# Patient Record
Sex: FEMALE | Race: WHITE | NOT HISPANIC OR LATINO | Employment: UNEMPLOYED | URBAN - NONMETROPOLITAN AREA
[De-identification: names, ages, dates, MRNs, and addresses within clinical notes are randomized per-mention and may not be internally consistent; named-entity substitution may affect disease eponyms.]

---

## 2017-09-01 ENCOUNTER — OFFICE VISIT (OUTPATIENT)
Dept: FAMILY MEDICINE CLINIC | Facility: CLINIC | Age: 67
End: 2017-09-01

## 2017-09-01 VITALS
OXYGEN SATURATION: 96 % | TEMPERATURE: 98.6 F | WEIGHT: 129.4 LBS | HEART RATE: 94 BPM | BODY MASS INDEX: 20.79 KG/M2 | SYSTOLIC BLOOD PRESSURE: 121 MMHG | HEIGHT: 66 IN | DIASTOLIC BLOOD PRESSURE: 85 MMHG

## 2017-09-01 DIAGNOSIS — F31.9 BIPOLAR AFFECTIVE DISORDER, REMISSION STATUS UNSPECIFIED (HCC): ICD-10-CM

## 2017-09-01 DIAGNOSIS — M79.672 BILATERAL FOOT PAIN: ICD-10-CM

## 2017-09-01 DIAGNOSIS — J01.00 ACUTE NON-RECURRENT MAXILLARY SINUSITIS: ICD-10-CM

## 2017-09-01 DIAGNOSIS — F32.A ANXIETY AND DEPRESSION: ICD-10-CM

## 2017-09-01 DIAGNOSIS — F41.9 ANXIETY AND DEPRESSION: ICD-10-CM

## 2017-09-01 DIAGNOSIS — K21.9 GASTROESOPHAGEAL REFLUX DISEASE, ESOPHAGITIS PRESENCE NOT SPECIFIED: Primary | ICD-10-CM

## 2017-09-01 DIAGNOSIS — M85.80 OSTEOPENIA: ICD-10-CM

## 2017-09-01 DIAGNOSIS — M79.671 BILATERAL FOOT PAIN: ICD-10-CM

## 2017-09-01 PROCEDURE — 99204 OFFICE O/P NEW MOD 45 MIN: CPT | Performed by: NURSE PRACTITIONER

## 2017-09-01 RX ORDER — QUETIAPINE FUMARATE 50 MG/1
50 TABLET, FILM COATED ORAL 2 TIMES DAILY
COMMUNITY
End: 2017-09-01 | Stop reason: SDUPTHER

## 2017-09-01 RX ORDER — DIVALPROEX SODIUM 500 MG/1
500 TABLET, DELAYED RELEASE ORAL 3 TIMES DAILY
Qty: 90 TABLET | Refills: 2 | Status: SHIPPED | OUTPATIENT
Start: 2017-09-01 | End: 2017-09-01 | Stop reason: SDUPTHER

## 2017-09-01 RX ORDER — OMEPRAZOLE 40 MG/1
40 CAPSULE, DELAYED RELEASE ORAL DAILY
Qty: 30 CAPSULE | Refills: 2 | Status: SHIPPED | OUTPATIENT
Start: 2017-09-01 | End: 2017-10-27 | Stop reason: SDUPTHER

## 2017-09-01 RX ORDER — DIVALPROEX SODIUM 500 MG/1
500 TABLET, DELAYED RELEASE ORAL 3 TIMES DAILY
COMMUNITY
End: 2017-09-01 | Stop reason: SDUPTHER

## 2017-09-01 RX ORDER — AMOXICILLIN 875 MG/1
875 TABLET, COATED ORAL 2 TIMES DAILY
Qty: 20 TABLET | Refills: 0 | Status: SHIPPED | OUTPATIENT
Start: 2017-09-01 | End: 2017-10-17

## 2017-09-01 RX ORDER — QUETIAPINE FUMARATE 50 MG/1
50 TABLET, FILM COATED ORAL 2 TIMES DAILY
Qty: 60 TABLET | Refills: 2 | Status: SHIPPED | OUTPATIENT
Start: 2017-09-01 | End: 2017-11-28 | Stop reason: SDUPTHER

## 2017-09-01 RX ORDER — DIVALPROEX SODIUM 500 MG/1
500 TABLET, DELAYED RELEASE ORAL 2 TIMES DAILY
Qty: 60 TABLET | Refills: 2 | Status: SHIPPED | OUTPATIENT
Start: 2017-09-01 | End: 2017-11-28 | Stop reason: SDUPTHER

## 2017-09-01 RX ORDER — OMEPRAZOLE 40 MG/1
40 CAPSULE, DELAYED RELEASE ORAL DAILY
Qty: 30 CAPSULE | Refills: 2 | Status: SHIPPED | OUTPATIENT
Start: 2017-09-01 | End: 2017-09-01 | Stop reason: SDUPTHER

## 2017-09-01 RX ORDER — QUETIAPINE FUMARATE 100 MG/1
100 TABLET, FILM COATED ORAL NIGHTLY
Qty: 30 TABLET | Refills: 2 | Status: SHIPPED | OUTPATIENT
Start: 2017-09-01 | End: 2017-11-28 | Stop reason: SDUPTHER

## 2017-09-01 RX ORDER — QUETIAPINE FUMARATE 100 MG/1
100 TABLET, FILM COATED ORAL NIGHTLY
COMMUNITY
End: 2017-09-01 | Stop reason: SDUPTHER

## 2017-09-01 RX ORDER — OMEPRAZOLE 40 MG/1
40 CAPSULE, DELAYED RELEASE ORAL DAILY
COMMUNITY
End: 2017-09-01 | Stop reason: SDUPTHER

## 2017-09-01 NOTE — PROGRESS NOTES
Subjective   Hamida Cazares is a 66 y.o. female.     Chief Complaint: Establish Care    History of Present Illness   Pt here today to establish care.  She has been living in [place], [place], and has moved here and needs to transfer care to this area.  Pt does have a history of GERD, bipolar disorder.    Head cold for 2 weeks; head congestion.  Amoxicillin usually seems to help.  Sinus congestion, maxillary sinus tenderness, mildly productive cough that is worse at night.  She states that she smokes 1/2 pack cigarettes a day.  She has smoked since she was 21 years old.  She has quit smoking 3 or 4 times in her lifetime but keeps going back to smoking.      Bipolar disorder with anxiety and depression in 1990s.  She has been taking Depakote and seroquel for the past 2-3 years and she has been doing really well.  She has had no issues at all with her bipolar disorder.  She states that her anxiety and depression is well controlled and her bipolar disorder is well controlled.  She states that she sleeps well.      Heel spurs.  She has had cortisone injections in the past by podiatrist and she would like to be referred to a podiatrist for additional injections if possible.  Pt states that she does have a lot of burning in her feet with the heel spurs.      Osteopenia.  Her last DEXA 4 months ago.  She is taking calcium, vit D and chondroitin.  She states that she had a lot of joint pains and since she has been taking the vitamins for several months and doing really well now.      GERD.  Pt has been taking omeprazole 40mg daily for the past 4-5 years when she was dx with gastritis.  Pt states that she had an EGD at that time.  She has been taking omeprazole since that time.  She denies abdominal pain, nausea, vomiting, diarrhea or constipation.  She does get acid reflux with spicy foods.  Otherwise her s/s are well controlled.      Pt states that she had lab work done, including depakote level, approx 3 months ago and all  her levels were within normal limits.     Pt refuses mammogram today.   Colonoscopy up to date.     Family History   Problem Relation Age of Onset   • Heart failure Mother    • Lung cancer Father        Social History     Social History   • Marital status: Single     Spouse name: N/A   • Number of children: N/A   • Years of education: N/A     Occupational History   • Not on file.     Social History Main Topics   • Smoking status: Heavy Tobacco Smoker     Packs/day: 0.50     Types: Cigarettes   • Smokeless tobacco: Never Used   • Alcohol use No   • Drug use: No   • Sexual activity: Defer     Other Topics Concern   • Not on file     Social History Narrative   • No narrative on file       Past Medical History:   Diagnosis Date   • Anxiety    • Arthritis    • Back pain    • Bipolar disorder    • Depression    • Gastritis    • GERD (gastroesophageal reflux disease)    • Heel spur        Review of Systems   Constitutional: Negative.    HENT: Positive for congestion and sinus pressure.    Respiratory: Positive for cough.    Cardiovascular: Negative.    Gastrointestinal: Negative.    Genitourinary: Negative.    Musculoskeletal:        Bilateral foot pain   Neurological: Negative.    Psychiatric/Behavioral: Negative.        Objective   Physical Exam   Constitutional: She is oriented to person, place, and time. She appears well-developed and well-nourished.   HENT:   Right Ear: External ear normal.   Left Ear: External ear normal.   Erythematous pharnyx; maxillary sinus tenderness   Eyes: Conjunctivae are normal. Pupils are equal, round, and reactive to light.   Neck: Normal range of motion. Neck supple.   Cardiovascular: Normal rate, regular rhythm and normal heart sounds.    Pulmonary/Chest: Effort normal. She has wheezes.   congested   Neurological: She is alert and oriented to person, place, and time.   Skin: Skin is warm and dry.   Psychiatric: She has a normal mood and affect. Her behavior is normal. Judgment and  "thought content normal.   Nursing note and vitals reviewed.      Procedures    Vitals: Blood pressure 121/85, pulse 94, temperature 98.6 °F (37 °C), temperature source Oral, height 65.5\" (166.4 cm), weight 129 lb 6.4 oz (58.7 kg), SpO2 96 %, not currently breastfeeding.    Allergies:   Allergies   Allergen Reactions   • Codeine    • Sulfa Antibiotics           Assessment/Plan   Hamida was seen today for establish care.    Diagnoses and all orders for this visit:    Gastroesophageal reflux disease, esophagitis presence not specified  -     Discontinue: omeprazole (priLOSEC) 40 MG capsule; Take 1 capsule by mouth Daily.  -     omeprazole (priLOSEC) 40 MG capsule; Take 1 capsule by mouth Daily.    Bilateral foot pain  -     Ambulatory Referral to Podiatry    Osteopenia    Bipolar affective disorder, remission status unspecified  -     Discontinue: divalproex (DEPAKOTE) 500 MG DR tablet; Take 1 tablet by mouth 3 (Three) Times a Day.  -     QUEtiapine (SEROquel) 100 MG tablet; Take 1 tablet by mouth Every Night.  -     QUEtiapine (SEROquel) 50 MG tablet; Take 1 tablet by mouth 2 (Two) Times a Day.  -     divalproex (DEPAKOTE) 500 MG DR tablet; Take 1 tablet by mouth 2 (Two) Times a Day.    Anxiety and depression  -     Discontinue: divalproex (DEPAKOTE) 500 MG DR tablet; Take 1 tablet by mouth 3 (Three) Times a Day.  -     QUEtiapine (SEROquel) 100 MG tablet; Take 1 tablet by mouth Every Night.  -     QUEtiapine (SEROquel) 50 MG tablet; Take 1 tablet by mouth 2 (Two) Times a Day.  -     divalproex (DEPAKOTE) 500 MG DR tablet; Take 1 tablet by mouth 2 (Two) Times a Day.    Acute non-recurrent maxillary sinusitis  -     amoxicillin (AMOXIL) 875 MG tablet; Take 1 tablet by mouth 2 (Two) Times a Day.               "

## 2017-10-17 ENCOUNTER — OFFICE VISIT (OUTPATIENT)
Dept: FAMILY MEDICINE CLINIC | Facility: CLINIC | Age: 67
End: 2017-10-17

## 2017-10-17 VITALS
BODY MASS INDEX: 20.96 KG/M2 | DIASTOLIC BLOOD PRESSURE: 74 MMHG | HEART RATE: 89 BPM | HEIGHT: 66 IN | OXYGEN SATURATION: 97 % | TEMPERATURE: 98.4 F | WEIGHT: 130.4 LBS | SYSTOLIC BLOOD PRESSURE: 106 MMHG

## 2017-10-17 DIAGNOSIS — Z23 IMMUNIZATION DUE: ICD-10-CM

## 2017-10-17 DIAGNOSIS — H66.90 ACUTE OTITIS MEDIA, UNSPECIFIED OTITIS MEDIA TYPE: ICD-10-CM

## 2017-10-17 DIAGNOSIS — R30.0 DYSURIA: ICD-10-CM

## 2017-10-17 DIAGNOSIS — R82.90 ABNORMAL URINALYSIS: Primary | ICD-10-CM

## 2017-10-17 PROBLEM — F31.9 BIPOLAR DISORDER: Status: ACTIVE | Noted: 2017-10-17

## 2017-10-17 PROBLEM — Q03.1: Status: ACTIVE | Noted: 2017-10-17

## 2017-10-17 PROBLEM — K21.9 GERD (GASTROESOPHAGEAL REFLUX DISEASE): Status: ACTIVE | Noted: 2017-10-17

## 2017-10-17 PROBLEM — M85.80 OSTEOPENIA: Status: ACTIVE | Noted: 2017-10-17

## 2017-10-17 LAB
BILIRUB BLD-MCNC: NEGATIVE MG/DL
CLARITY, POC: ABNORMAL
COLOR UR: YELLOW
GLUCOSE UR STRIP-MCNC: NEGATIVE MG/DL
KETONES UR QL: NEGATIVE
LEUKOCYTE EST, POC: NEGATIVE
NITRITE UR-MCNC: NEGATIVE MG/ML
PH UR: 6.5 [PH] (ref 5–8)
PROT UR STRIP-MCNC: ABNORMAL MG/DL
RBC # UR STRIP: NEGATIVE /UL
SP GR UR: 1.01 (ref 1–1.03)
UROBILINOGEN UR QL: NORMAL

## 2017-10-17 PROCEDURE — 90471 IMMUNIZATION ADMIN: CPT | Performed by: NURSE PRACTITIONER

## 2017-10-17 PROCEDURE — 87086 URINE CULTURE/COLONY COUNT: CPT | Performed by: NURSE PRACTITIONER

## 2017-10-17 PROCEDURE — 87077 CULTURE AEROBIC IDENTIFY: CPT | Performed by: NURSE PRACTITIONER

## 2017-10-17 PROCEDURE — 99213 OFFICE O/P EST LOW 20 MIN: CPT | Performed by: NURSE PRACTITIONER

## 2017-10-17 PROCEDURE — 81003 URINALYSIS AUTO W/O SCOPE: CPT | Performed by: NURSE PRACTITIONER

## 2017-10-17 PROCEDURE — 87186 SC STD MICRODIL/AGAR DIL: CPT | Performed by: NURSE PRACTITIONER

## 2017-10-17 PROCEDURE — 90662 IIV NO PRSV INCREASED AG IM: CPT | Performed by: NURSE PRACTITIONER

## 2017-10-17 RX ORDER — AMOXICILLIN 875 MG/1
875 TABLET, COATED ORAL 2 TIMES DAILY
Qty: 20 TABLET | Refills: 0 | Status: SHIPPED | OUTPATIENT
Start: 2017-10-17 | End: 2017-11-28

## 2017-10-17 NOTE — PROGRESS NOTES
Subjective   Hamida Cazares is a 67 y.o. female.     Chief Complaint: Difficulty Urinating    History of Present Illness   Patient here today with complaints of difficulty urinating.  She states that she has been having some mid lower pelvic pain also.  She denies any fever, confusion, nausea vomiting or diarrhea.  She states that she has had UTIs in the past and this is the same symptoms that she always has when she is getting a UTI.    Patient also states that she has been having some head congestion and cough.  She states that she has been having ear pain for the past week or so.  Again she denies any fever, headache, nausea vomiting or diarrhea.  Patient also is requesting a prescription for amoxicillin.  She states that this seems to be the only antibiotic that helps with her symptoms.  He does been several months since she has had any prescriptions for any antibiotics.    Family History   Problem Relation Age of Onset   • Heart failure Mother    • Lung cancer Father        Social History     Social History   • Marital status: Single     Spouse name: N/A   • Number of children: N/A   • Years of education: N/A     Occupational History   • Not on file.     Social History Main Topics   • Smoking status: Heavy Tobacco Smoker     Packs/day: 0.50     Types: Cigarettes   • Smokeless tobacco: Never Used   • Alcohol use No   • Drug use: No   • Sexual activity: Defer     Other Topics Concern   • Not on file     Social History Narrative       Past Medical History:   Diagnosis Date   • Anxiety    • Arthritis    • Back pain    • Bipolar disorder    • Depression    • Gastritis    • GERD (gastroesophageal reflux disease)    • Heel spur        Review of Systems   Constitutional: Negative.    HENT: Positive for ear pain.    Respiratory: Negative.    Cardiovascular: Negative.    Gastrointestinal: Negative.    Genitourinary: Positive for dysuria.   Musculoskeletal: Negative.    Skin: Negative.    Neurological: Positive for  "dizziness.   Psychiatric/Behavioral: Negative.        Objective   Physical Exam   Constitutional: She is oriented to person, place, and time. She appears well-developed and well-nourished.   HENT:   Head: Normocephalic and atraumatic.   Right Ear: External ear normal.   Left Ear: External ear normal.   Mouth/Throat: Oropharynx is clear and moist.   Bilateral TMs erythematous   Eyes: Conjunctivae are normal. Pupils are equal, round, and reactive to light.   Neck: Normal range of motion. Neck supple.   Cardiovascular: Normal rate, regular rhythm and normal heart sounds.    Pulmonary/Chest: Effort normal and breath sounds normal.   Neurological: She is alert and oriented to person, place, and time.   Skin: Skin is warm and dry.   Psychiatric: She has a normal mood and affect. Her behavior is normal. Judgment and thought content normal.   Nursing note and vitals reviewed.      Procedures    Vitals: Blood pressure 106/74, pulse 89, temperature 98.4 °F (36.9 °C), temperature source Oral, height 65.5\" (166.4 cm), weight 130 lb 6.4 oz (59.1 kg), SpO2 97 %, not currently breastfeeding.    Allergies:   Allergies   Allergen Reactions   • Codeine    • Sulfa Antibiotics           Assessment/Plan   Hamida was seen today for difficulty urinating.    Diagnoses and all orders for this visit:    Abnormal urinalysis  -     Urine Culture - Urine, Urine, Clean Catch    Dysuria  -     POCT urinalysis dipstick, automated  -     Urine Culture - Urine, Urine, Clean Catch    Acute otitis media, unspecified otitis media type  -     amoxicillin (AMOXIL) 875 MG tablet; Take 1 tablet by mouth 2 (Two) Times a Day.    Immunization due  -     Flu Vaccine High Dose PF 65YR+ ([id number])               "

## 2017-10-19 ENCOUNTER — TELEPHONE (OUTPATIENT)
Dept: FAMILY MEDICINE CLINIC | Facility: CLINIC | Age: 67
End: 2017-10-19

## 2017-10-19 LAB
BACTERIA SPEC AEROBE CULT: ABNORMAL
BACTERIA SPEC AEROBE CULT: ABNORMAL

## 2017-10-19 NOTE — TELEPHONE ENCOUNTER
----- Message from RAJESH Lorenzo sent at 10/19/2017 10:08 AM EDT -----  She was prescribed Amoxicillin.  She needs to repeat her U/A after completing prescription.  Send repeat U/A to lab for culture

## 2017-10-24 RX ORDER — NITROFURANTOIN 25; 75 MG/1; MG/1
100 CAPSULE ORAL 2 TIMES DAILY
Qty: 14 CAPSULE | Refills: 0 | Status: SHIPPED | OUTPATIENT
Start: 2017-10-24 | End: 2017-10-24 | Stop reason: SDUPTHER

## 2017-10-24 RX ORDER — NITROFURANTOIN 25; 75 MG/1; MG/1
100 CAPSULE ORAL 2 TIMES DAILY
Qty: 14 CAPSULE | Refills: 0 | Status: SHIPPED | OUTPATIENT
Start: 2017-10-24 | End: 2017-11-28

## 2017-10-24 NOTE — TELEPHONE ENCOUNTER
Pt came by office and I advised about u/a. Lillian sent new med in that will cover the bacteria the pt had.

## 2017-10-27 DIAGNOSIS — K21.9 GASTROESOPHAGEAL REFLUX DISEASE, ESOPHAGITIS PRESENCE NOT SPECIFIED: ICD-10-CM

## 2017-10-27 RX ORDER — OMEPRAZOLE 40 MG/1
40 CAPSULE, DELAYED RELEASE ORAL DAILY
Qty: 30 CAPSULE | Refills: 2 | Status: SHIPPED | OUTPATIENT
Start: 2017-10-27 | End: 2017-11-28 | Stop reason: SDUPTHER

## 2017-10-27 NOTE — TELEPHONE ENCOUNTER
Patient called requested her Omeprazole be sent to [facility] & her Pharmacy be changed to [facility],sent per orders & chart updated.

## 2017-10-30 ENCOUNTER — TELEPHONE (OUTPATIENT)
Dept: FAMILY MEDICINE CLINIC | Facility: CLINIC | Age: 67
End: 2017-10-30

## 2017-10-30 RX ORDER — ONDANSETRON HYDROCHLORIDE 8 MG/1
8 TABLET, FILM COATED ORAL EVERY 8 HOURS PRN
Qty: 10 TABLET | Refills: 0 | Status: ON HOLD | OUTPATIENT
Start: 2017-10-30 | End: 2020-10-06

## 2017-10-30 NOTE — TELEPHONE ENCOUNTER
Patient called requesting Zofran,reports she has severe nausea,has taken Zofran in the past & it really helped her?

## 2017-11-28 ENCOUNTER — OFFICE VISIT (OUTPATIENT)
Dept: FAMILY MEDICINE CLINIC | Facility: CLINIC | Age: 67
End: 2017-11-28

## 2017-11-28 VITALS
HEART RATE: 93 BPM | SYSTOLIC BLOOD PRESSURE: 117 MMHG | OXYGEN SATURATION: 97 % | HEIGHT: 66 IN | WEIGHT: 130.4 LBS | BODY MASS INDEX: 20.96 KG/M2 | DIASTOLIC BLOOD PRESSURE: 81 MMHG

## 2017-11-28 DIAGNOSIS — M79.672 BILATERAL FOOT PAIN: ICD-10-CM

## 2017-11-28 DIAGNOSIS — F31.9 BIPOLAR AFFECTIVE DISORDER, REMISSION STATUS UNSPECIFIED (HCC): ICD-10-CM

## 2017-11-28 DIAGNOSIS — F41.9 ANXIETY AND DEPRESSION: ICD-10-CM

## 2017-11-28 DIAGNOSIS — K21.9 GASTROESOPHAGEAL REFLUX DISEASE, ESOPHAGITIS PRESENCE NOT SPECIFIED: Primary | ICD-10-CM

## 2017-11-28 DIAGNOSIS — M79.671 BILATERAL FOOT PAIN: ICD-10-CM

## 2017-11-28 DIAGNOSIS — F32.A ANXIETY AND DEPRESSION: ICD-10-CM

## 2017-11-28 PROCEDURE — 99214 OFFICE O/P EST MOD 30 MIN: CPT | Performed by: NURSE PRACTITIONER

## 2017-11-28 RX ORDER — QUETIAPINE FUMARATE 100 MG/1
100 TABLET, FILM COATED ORAL NIGHTLY
Qty: 30 TABLET | Refills: 2 | Status: SHIPPED | OUTPATIENT
Start: 2017-11-28 | End: 2018-02-15 | Stop reason: SDUPTHER

## 2017-11-28 RX ORDER — OMEPRAZOLE 20 MG/1
20 CAPSULE, DELAYED RELEASE ORAL DAILY
Qty: 30 CAPSULE | Refills: 5 | Status: SHIPPED | OUTPATIENT
Start: 2017-11-28 | End: 2018-01-03 | Stop reason: SDUPTHER

## 2017-11-28 RX ORDER — QUETIAPINE FUMARATE 50 MG/1
50 TABLET, FILM COATED ORAL 2 TIMES DAILY
Qty: 60 TABLET | Refills: 2 | Status: SHIPPED | OUTPATIENT
Start: 2017-11-28 | End: 2018-02-15 | Stop reason: SDUPTHER

## 2017-11-28 RX ORDER — DIVALPROEX SODIUM 500 MG/1
500 TABLET, DELAYED RELEASE ORAL 2 TIMES DAILY
Qty: 60 TABLET | Refills: 2 | Status: SHIPPED | OUTPATIENT
Start: 2017-11-28 | End: 2018-02-15 | Stop reason: SDUPTHER

## 2017-11-28 NOTE — PROGRESS NOTES
Subjective   Hamida Cazares is a 67 y.o. female.     Chief Complaint: Follow-up (needs referral to ortho ); Heartburn; and Med Refill    History of Present Illness   Heel spurs for the past year.  She had an xray at urgent care and she had an xray and was told she had heel spurs.  She has a lot of pain in her feet.  She has seen a podiatrist in the past and has underwent steroid injections in her feet that did not help with her pain.  She is having a lot of pain when she is walking.  She would like to be referred to orthopedist for evaluation.     GERD.  Taking prilosec 40mg and would like to decrease her dose.  She is afraid of some of the side effects she has read about and would like to take the least dose possible for her symptoms.  She states that her symptoms are well controlled at this time and would like to decrease to 20mg to see if her symptoms will be controlled.    Tobacco abuse.  Father had lung cancer and she is afraid that she may have lung cancer also.  She is a smoker and has been for several years.     Bipolar disorder.  Pt has been taking depakote and seroquel for her symptoms for several months and has been tolerating these meds well.  She states that she feels better since she has been taking these medications.  Her symptoms are well controlled and she does not wish to change any meds or dosages at this time.  She is not currently seeing psych and would like to continue her meds.  She denies any suicidal thoughts.      Family History   Problem Relation Age of Onset   • Heart failure Mother    • Lung cancer Father        Social History     Social History   • Marital status: Single     Spouse name: N/A   • Number of children: N/A   • Years of education: N/A     Occupational History   • Not on file.     Social History Main Topics   • Smoking status: Heavy Tobacco Smoker     Packs/day: 0.50     Types: Cigarettes   • Smokeless tobacco: Never Used   • Alcohol use No   • Drug use: No   • Sexual activity:  "Defer     Other Topics Concern   • Not on file     Social History Narrative       Past Medical History:   Diagnosis Date   • Anxiety    • Arthritis    • Back pain    • Bipolar disorder    • Depression    • Gastritis    • GERD (gastroesophageal reflux disease)    • Heel spur        Review of Systems   Constitutional: Negative.    HENT: Negative.    Respiratory: Negative.    Cardiovascular: Negative.    Gastrointestinal: Negative.    Musculoskeletal:        Bilateral foot pain   Skin: Negative.    Neurological: Negative.    Psychiatric/Behavioral: Negative.        Objective   Physical Exam   Constitutional: She is oriented to person, place, and time. She appears well-developed and well-nourished.   Neck: Normal range of motion. Neck supple.   Cardiovascular: Normal rate, regular rhythm and normal heart sounds.    Pulmonary/Chest: Effort normal and breath sounds normal.   Neurological: She is alert and oriented to person, place, and time.   Skin: Skin is warm and dry.   Psychiatric: She has a normal mood and affect. Her behavior is normal. Judgment and thought content normal.   Nursing note and vitals reviewed.      Procedures    Vitals: Blood pressure 117/81, pulse 93, height 65.5\" (166.4 cm), weight 130 lb 6.4 oz (59.1 kg), SpO2 97 %, not currently breastfeeding.    Allergies:   Allergies   Allergen Reactions   • Codeine    • Sulfa Antibiotics           Assessment/Plan   Haimda was seen today for follow-up, heartburn and med refill.    Diagnoses and all orders for this visit:    Gastroesophageal reflux disease, esophagitis presence not specified  -     omeprazole (priLOSEC) 20 MG capsule; Take 1 capsule by mouth Daily.    Bilateral foot pain  -     Ambulatory Referral to Orthopedic Surgery    Bipolar affective disorder, remission status unspecified  -     divalproex (DEPAKOTE) 500 MG DR tablet; Take 1 tablet by mouth 2 (Two) Times a Day.  -     QUEtiapine (SEROquel) 100 MG tablet; Take 1 tablet by mouth Every " Night.  -     QUEtiapine (SEROquel) 50 MG tablet; Take 1 tablet by mouth 2 (Two) Times a Day.    Anxiety and depression  -     divalproex (DEPAKOTE) 500 MG DR tablet; Take 1 tablet by mouth 2 (Two) Times a Day.  -     QUEtiapine (SEROquel) 100 MG tablet; Take 1 tablet by mouth Every Night.  -     QUEtiapine (SEROquel) 50 MG tablet; Take 1 tablet by mouth 2 (Two) Times a Day.    Continue current meds as before.

## 2018-01-03 ENCOUNTER — TELEPHONE (OUTPATIENT)
Dept: FAMILY MEDICINE CLINIC | Facility: CLINIC | Age: 68
End: 2018-01-03

## 2018-01-03 DIAGNOSIS — K21.9 GASTROESOPHAGEAL REFLUX DISEASE, ESOPHAGITIS PRESENCE NOT SPECIFIED: ICD-10-CM

## 2018-01-03 RX ORDER — OMEPRAZOLE 20 MG/1
20 CAPSULE, DELAYED RELEASE ORAL DAILY
Qty: 30 CAPSULE | Refills: 0 | Status: SHIPPED | OUTPATIENT
Start: 2018-01-03 | End: 2018-02-15 | Stop reason: SDUPTHER

## 2018-01-03 NOTE — TELEPHONE ENCOUNTER
REQUESTS REFILL ON OMEPRAZOLE 20MG #30. [facility]    Refilled x 1 month pending follow up scheduled.

## 2018-01-09 ENCOUNTER — OFFICE VISIT (OUTPATIENT)
Dept: FAMILY MEDICINE CLINIC | Facility: CLINIC | Age: 68
End: 2018-01-09

## 2018-01-09 VITALS
WEIGHT: 127 LBS | DIASTOLIC BLOOD PRESSURE: 79 MMHG | HEIGHT: 66 IN | BODY MASS INDEX: 20.41 KG/M2 | OXYGEN SATURATION: 94 % | HEART RATE: 93 BPM | SYSTOLIC BLOOD PRESSURE: 108 MMHG

## 2018-01-09 DIAGNOSIS — K59.00 CONSTIPATION, UNSPECIFIED CONSTIPATION TYPE: ICD-10-CM

## 2018-01-09 DIAGNOSIS — M79.672 BILATERAL FOOT PAIN: Primary | ICD-10-CM

## 2018-01-09 DIAGNOSIS — R42 DIZZINESS: ICD-10-CM

## 2018-01-09 DIAGNOSIS — F41.9 ANXIETY: ICD-10-CM

## 2018-01-09 DIAGNOSIS — M79.671 BILATERAL FOOT PAIN: Primary | ICD-10-CM

## 2018-01-09 LAB
ALBUMIN SERPL-MCNC: 4.5 G/DL (ref 3.4–4.8)
ALBUMIN/GLOB SERPL: 1.7 G/DL (ref 1.5–2.5)
ALP SERPL-CCNC: 64 U/L (ref 35–104)
ALT SERPL W P-5'-P-CCNC: 18 U/L (ref 10–36)
ANION GAP SERPL CALCULATED.3IONS-SCNC: 3.7 MMOL/L (ref 3.6–11.2)
AST SERPL-CCNC: 35 U/L (ref 10–30)
BASOPHILS # BLD AUTO: 0.03 10*3/MM3 (ref 0–0.3)
BASOPHILS NFR BLD AUTO: 0.5 % (ref 0–2)
BILIRUB SERPL-MCNC: 0.3 MG/DL (ref 0.2–1.8)
BUN BLD-MCNC: 8 MG/DL (ref 7–21)
BUN/CREAT SERPL: 8.9 (ref 7–25)
CALCIUM SPEC-SCNC: 9.6 MG/DL (ref 7.7–10)
CHLORIDE SERPL-SCNC: 106 MMOL/L (ref 99–112)
CO2 SERPL-SCNC: 30.3 MMOL/L (ref 24.3–31.9)
CREAT BLD-MCNC: 0.9 MG/DL (ref 0.43–1.29)
DEPRECATED RDW RBC AUTO: 47.2 FL (ref 37–54)
EOSINOPHIL # BLD AUTO: 0.15 10*3/MM3 (ref 0–0.7)
EOSINOPHIL NFR BLD AUTO: 2.4 % (ref 0–7)
ERYTHROCYTE [DISTWIDTH] IN BLOOD BY AUTOMATED COUNT: 13.7 % (ref 11.5–14.5)
GFR SERPL CREATININE-BSD FRML MDRD: 63 ML/MIN/1.73
GLOBULIN UR ELPH-MCNC: 2.7 GM/DL
GLUCOSE BLD-MCNC: 77 MG/DL (ref 70–110)
HCT VFR BLD AUTO: 43.3 % (ref 37–47)
HGB BLD-MCNC: 14.3 G/DL (ref 12–16)
IMM GRANULOCYTES # BLD: 0.02 10*3/MM3 (ref 0–0.03)
IMM GRANULOCYTES NFR BLD: 0.3 % (ref 0–0.5)
LYMPHOCYTES # BLD AUTO: 2.51 10*3/MM3 (ref 1–3)
LYMPHOCYTES NFR BLD AUTO: 40.2 % (ref 16–46)
MAGNESIUM SERPL-MCNC: 2.2 MG/DL (ref 1.7–2.6)
MCH RBC QN AUTO: 32.2 PG (ref 27–33)
MCHC RBC AUTO-ENTMCNC: 33 G/DL (ref 33–37)
MCV RBC AUTO: 97.5 FL (ref 80–94)
MONOCYTES # BLD AUTO: 0.68 10*3/MM3 (ref 0.1–0.9)
MONOCYTES NFR BLD AUTO: 10.9 % (ref 0–12)
NEUTROPHILS # BLD AUTO: 2.85 10*3/MM3 (ref 1.4–6.5)
NEUTROPHILS NFR BLD AUTO: 45.7 % (ref 40–75)
OSMOLALITY SERPL CALC.SUM OF ELEC: 276.5 MOSM/KG (ref 273–305)
PLATELET # BLD AUTO: 178 10*3/MM3 (ref 130–400)
PMV BLD AUTO: 10.9 FL (ref 6–10)
POTASSIUM BLD-SCNC: 5 MMOL/L (ref 3.5–5.3)
PROT SERPL-MCNC: 7.2 G/DL (ref 6–8)
RBC # BLD AUTO: 4.44 10*6/MM3 (ref 4.2–5.4)
SODIUM BLD-SCNC: 140 MMOL/L (ref 135–153)
T4 FREE SERPL-MCNC: 0.89 NG/DL (ref 0.89–1.76)
TSH SERPL DL<=0.05 MIU/L-ACNC: 1.03 MIU/ML (ref 0.55–4.78)
VIT B12 BLD-MCNC: 458 PG/ML (ref 211–911)
WBC NRBC COR # BLD: 6.24 10*3/MM3 (ref 4.5–12.5)

## 2018-01-09 PROCEDURE — 80053 COMPREHEN METABOLIC PANEL: CPT | Performed by: NURSE PRACTITIONER

## 2018-01-09 PROCEDURE — 83735 ASSAY OF MAGNESIUM: CPT | Performed by: NURSE PRACTITIONER

## 2018-01-09 PROCEDURE — 82607 VITAMIN B-12: CPT | Performed by: NURSE PRACTITIONER

## 2018-01-09 PROCEDURE — 85025 COMPLETE CBC W/AUTO DIFF WBC: CPT | Performed by: NURSE PRACTITIONER

## 2018-01-09 PROCEDURE — 99214 OFFICE O/P EST MOD 30 MIN: CPT | Performed by: NURSE PRACTITIONER

## 2018-01-09 PROCEDURE — 84443 ASSAY THYROID STIM HORMONE: CPT | Performed by: NURSE PRACTITIONER

## 2018-01-09 PROCEDURE — 84439 ASSAY OF FREE THYROXINE: CPT | Performed by: NURSE PRACTITIONER

## 2018-01-09 RX ORDER — ASCORBIC ACID 500 MG
500 TABLET ORAL DAILY
Status: ON HOLD | COMMUNITY
End: 2020-10-06

## 2018-01-09 NOTE — PROGRESS NOTES
Subjective   Hamida Cazares is a 67 y.o. female.     Chief Complaint: Dizziness (pt requests referral to ENT )    History of Present Illness   Heel spurs for the past year.  She had an xray at urgent care and she had an xray and was told she had heel spurs.  She has a lot of pain in her feet.  She has seen a podiatrist in the past and has underwent steroid injections in her feet that did not help with her pain.  She is having a lot of pain when she is walking.  Pt states that she was evaluated by provider at [facility] and was not happy with the care she received.  She was told that she could not have surgery for her problem. Pt is requesting to be evaluated by Dr. An in [place].  I have agreed to refer patient to Dr. An.     Pt states that about 9 months ago she started having dizziness.  She states that she has been having the dizziness on a daily basis and it tends to come and go every day.  She is having to hold onto things to keep her balance and to keep from falling.  Her symptoms start as soon as she gets out of the bed.  It can happen when she is walking in the grocery store. She denies any fever, head congestion, n/v/d.  She doesn't feel that the room is spinning; she feels like the floor is moving up and down when she walks.     Pt states that she has occasional episodes for the past two years of having dots in vision.  She has had an eye exam and was told there was nothing wrong with her eyes or vision.  Patient has not had labs done in several months and requests to have labs done today.  Pt does c/o fatigue also.     Constipation.  Pt states that she is addicted to laxatives.  She states that she takes a laxative about every 4 days.  I have discussed with patient today the risks of continued laxative use.  Discussed fiber intake, fluid intake.      Family History   Problem Relation Age of Onset   • Heart failure Mother    • Lung cancer Father        Social History     Social History   •  "Marital status: Single     Spouse name: N/A   • Number of children: N/A   • Years of education: N/A     Occupational History   • Not on file.     Social History Main Topics   • Smoking status: Heavy Tobacco Smoker     Packs/day: 0.50     Types: Cigarettes   • Smokeless tobacco: Never Used   • Alcohol use No   • Drug use: No   • Sexual activity: Defer     Other Topics Concern   • Not on file     Social History Narrative       Past Medical History:   Diagnosis Date   • Anxiety    • Arthritis    • Back pain    • Bipolar disorder    • Depression    • Gastritis    • GERD (gastroesophageal reflux disease)    • Heel spur        Review of Systems   Constitutional: Positive for fatigue.   HENT: Negative.    Eyes: Positive for visual disturbance.   Respiratory: Negative.    Cardiovascular: Negative.    Gastrointestinal: Negative.    Musculoskeletal:        Bilateral foot pain   Skin: Negative.    Neurological: Positive for dizziness.   Psychiatric/Behavioral: The patient is nervous/anxious.        Objective   Physical Exam   Constitutional: She is oriented to person, place, and time. She appears well-developed and well-nourished.   Eyes: Conjunctivae are normal. Pupils are equal, round, and reactive to light.   Neck: Normal range of motion. Neck supple.   Cardiovascular: Normal rate, regular rhythm and normal heart sounds.    Pulmonary/Chest: Effort normal and breath sounds normal.   Neurological: She is alert and oriented to person, place, and time.   Skin: Skin is warm and dry.   Psychiatric: She has a normal mood and affect. Her behavior is normal. Judgment and thought content normal.   Nursing note and vitals reviewed.      Procedures    Vitals: Blood pressure 108/79, pulse 93, height 166.4 cm (65.5\"), weight 57.6 kg (127 lb), SpO2 94 %, not currently breastfeeding.    Allergies:   Allergies   Allergen Reactions   • Codeine    • Morphine And Related    • Sulfa Antibiotics           Assessment/Plan   Hamida was seen today " for dizziness.    Diagnoses and all orders for this visit:    Bilateral foot pain  -     Ambulatory Referral to Orthopedic Surgery  -     CBC & Differential  -     Comprehensive Metabolic Panel  -     Magnesium  -     TSH  -     T4, Free  -     Vitamin B12  -     CBC Auto Differential    Dizziness  -     Ambulatory Referral to ENT (Otolaryngology)  -     CBC & Differential  -     Comprehensive Metabolic Panel  -     Magnesium  -     TSH  -     T4, Free  -     Vitamin B12  -     CBC Auto Differential    Constipation, unspecified constipation type  -     CBC & Differential  -     Comprehensive Metabolic Panel  -     Magnesium  -     TSH  -     T4, Free  -     Vitamin B12  -     CBC Auto Differential    Anxiety  -     CBC & Differential  -     Comprehensive Metabolic Panel  -     Magnesium  -     TSH  -     T4, Free  -     Vitamin B12  -     CBC Auto Differential

## 2018-01-10 ENCOUNTER — TELEPHONE (OUTPATIENT)
Dept: FAMILY MEDICINE CLINIC | Facility: CLINIC | Age: 68
End: 2018-01-10

## 2018-01-10 NOTE — TELEPHONE ENCOUNTER
----- Message from RAJESH Lorenzo sent at 1/10/2018  1:10 PM EST -----  Labs appear to be stable.  Please let her know.        Attempted to contact patient,voice mailbox has not been set up,will attempt later.      Still unable to reach patient,stable letter mailed.

## 2018-02-15 ENCOUNTER — OFFICE VISIT (OUTPATIENT)
Dept: FAMILY MEDICINE CLINIC | Facility: CLINIC | Age: 68
End: 2018-02-15

## 2018-02-15 VITALS
WEIGHT: 128.6 LBS | HEIGHT: 66 IN | DIASTOLIC BLOOD PRESSURE: 86 MMHG | OXYGEN SATURATION: 98 % | BODY MASS INDEX: 20.67 KG/M2 | SYSTOLIC BLOOD PRESSURE: 118 MMHG | HEART RATE: 101 BPM

## 2018-02-15 DIAGNOSIS — M79.672 BILATERAL FOOT PAIN: Primary | ICD-10-CM

## 2018-02-15 DIAGNOSIS — F41.9 ANXIETY AND DEPRESSION: ICD-10-CM

## 2018-02-15 DIAGNOSIS — E78.5 HYPERLIPIDEMIA, UNSPECIFIED HYPERLIPIDEMIA TYPE: ICD-10-CM

## 2018-02-15 DIAGNOSIS — Z20.2 POTENTIAL EXPOSURE TO STD: ICD-10-CM

## 2018-02-15 DIAGNOSIS — K21.9 GASTROESOPHAGEAL REFLUX DISEASE, ESOPHAGITIS PRESENCE NOT SPECIFIED: ICD-10-CM

## 2018-02-15 DIAGNOSIS — F32.A ANXIETY AND DEPRESSION: ICD-10-CM

## 2018-02-15 DIAGNOSIS — F31.9 BIPOLAR AFFECTIVE DISORDER, REMISSION STATUS UNSPECIFIED (HCC): ICD-10-CM

## 2018-02-15 DIAGNOSIS — M79.671 BILATERAL FOOT PAIN: Primary | ICD-10-CM

## 2018-02-15 PROCEDURE — 99214 OFFICE O/P EST MOD 30 MIN: CPT | Performed by: NURSE PRACTITIONER

## 2018-02-15 RX ORDER — OMEPRAZOLE 20 MG/1
20 CAPSULE, DELAYED RELEASE ORAL DAILY
Qty: 30 CAPSULE | Refills: 5 | Status: SHIPPED | OUTPATIENT
Start: 2018-02-15 | End: 2018-08-10 | Stop reason: SDUPTHER

## 2018-02-15 RX ORDER — QUETIAPINE FUMARATE 50 MG/1
50 TABLET, FILM COATED ORAL NIGHTLY
Qty: 30 TABLET | Refills: 5 | Status: SHIPPED | OUTPATIENT
Start: 2018-02-15 | End: 2018-02-27 | Stop reason: SDUPTHER

## 2018-02-15 RX ORDER — DIVALPROEX SODIUM 500 MG/1
500 TABLET, DELAYED RELEASE ORAL 2 TIMES DAILY
Qty: 60 TABLET | Refills: 5 | Status: SHIPPED | OUTPATIENT
Start: 2018-02-15 | End: 2018-08-10 | Stop reason: SDUPTHER

## 2018-02-15 RX ORDER — QUETIAPINE FUMARATE 100 MG/1
100 TABLET, FILM COATED ORAL NIGHTLY
Qty: 30 TABLET | Refills: 5 | Status: SHIPPED | OUTPATIENT
Start: 2018-02-15 | End: 2018-08-09 | Stop reason: SDUPTHER

## 2018-02-15 NOTE — PROGRESS NOTES
Subjective   Hamida Cazares is a 67 y.o. female.     Chief Complaint: Follow-up and Foot Pain    History of Present Illness   Pt here for follow up today on anxiety and depression, bilateral foot pain.     Anxiety and depression.    Pt has been taking depakote and seroquel for the past 3 years and tolerating well.  She states that her symptoms are well controlled well.  She has tried several other medications in the past and she could not tolerate the side effects of the other medications.  She denies any suicidal thoughts.     Bilateral foot pain.    Pt states that she saw Dr. An for her pain.  He has recommended that she have physical therapy with her feet prior to having any type of surgery.  She has appt scheduled with PT on 2/19/2018.  She has tried Lyrica and also Gabapentin for her pain and could not tolerate the side effects.     Pt is requesting to be tested for STD exposure.  She would like to be checked for HIV and Hepatitis.  She states that she was checked about 4 years ago but would like to be tested again at this time.  She states that she is not sexually active at this time.       Family History   Problem Relation Age of Onset   • Heart failure Mother    • Lung cancer Father        Social History     Social History   • Marital status: Single     Spouse name: N/A   • Number of children: N/A   • Years of education: N/A     Occupational History   • Not on file.     Social History Main Topics   • Smoking status: Heavy Tobacco Smoker     Packs/day: 0.50     Types: Cigarettes   • Smokeless tobacco: Never Used   • Alcohol use No   • Drug use: No   • Sexual activity: Defer     Other Topics Concern   • Not on file     Social History Narrative       Past Medical History:   Diagnosis Date   • Anxiety    • Arthritis    • Back pain    • Bipolar disorder    • Depression    • Gastritis    • GERD (gastroesophageal reflux disease)    • Heel spur        Review of Systems   Constitutional: Negative.    HENT: Negative.   "  Respiratory: Negative.    Cardiovascular: Negative.    Gastrointestinal: Negative.    Musculoskeletal:        Bilateral foot pain   Skin: Negative.    Neurological: Negative.    Psychiatric/Behavioral: Negative.        Objective   Physical Exam   Constitutional: She is oriented to person, place, and time. She appears well-developed and well-nourished.   Neck: Normal range of motion. Neck supple.   Cardiovascular: Normal rate, regular rhythm and normal heart sounds.    Pulmonary/Chest: Effort normal and breath sounds normal.   Neurological: She is alert and oriented to person, place, and time.   Skin: Skin is warm and dry.   Psychiatric: She has a normal mood and affect. Her behavior is normal. Judgment and thought content normal.   Nursing note and vitals reviewed.      Procedures    Vitals: Blood pressure 118/86, pulse 101, height 166.4 cm (65.5\"), weight 58.3 kg (128 lb 9.6 oz), SpO2 98 %, not currently breastfeeding.    Allergies:   Allergies   Allergen Reactions   • Codeine    • Morphine And Related    • Sulfa Antibiotics           Assessment/Plan   Hamida was seen today for follow-up and foot pain.    Diagnoses and all orders for this visit:    Bilateral foot pain    Hyperlipidemia, unspecified hyperlipidemia type  -     Lipid panel; Future    Bipolar affective disorder, remission status unspecified  -     Valproic Acid Level, Total; Future  -     divalproex (DEPAKOTE) 500 MG DR tablet; Take 1 tablet by mouth 2 (Two) Times a Day.  -     QUEtiapine (SEROquel) 100 MG tablet; Take 1 tablet by mouth Every Night.  -     QUEtiapine (SEROquel) 50 MG tablet; Take 1 tablet by mouth Every Night.    Potential exposure to STD  -     HIV-1/O/2 Ag/Ab w Reflex; Future  -     Hepatitis Panel, Acute; Future    Anxiety and depression  -     divalproex (DEPAKOTE) 500 MG DR tablet; Take 1 tablet by mouth 2 (Two) Times a Day.  -     QUEtiapine (SEROquel) 100 MG tablet; Take 1 tablet by mouth Every Night.  -     QUEtiapine " (SEROquel) 50 MG tablet; Take 1 tablet by mouth Every Night.    Gastroesophageal reflux disease, esophagitis presence not specified  -     omeprazole (priLOSEC) 20 MG capsule; Take 1 capsule by mouth Daily.

## 2018-02-16 ENCOUNTER — TELEPHONE (OUTPATIENT)
Dept: FAMILY MEDICINE CLINIC | Facility: CLINIC | Age: 68
End: 2018-02-16

## 2018-02-16 NOTE — TELEPHONE ENCOUNTER
"Pt called and requests a print of \"natural ways to increase dopamine\" pt stated she has \"heard\" about doing this naturally and requesting Sheilas input. I advised pt to keep taking meds as prescribed. Pt verbalized understanding and stated she will no stop her meds.    "

## 2018-02-16 NOTE — TELEPHONE ENCOUNTER
I am not aware of any approved natural ways to increase dopamine.  There are several articles on the internet regarding increasing dopamine naturally; however, none appear to be clinically approved.

## 2018-02-19 ENCOUNTER — HOSPITAL ENCOUNTER (OUTPATIENT)
Dept: PHYSICAL THERAPY | Facility: HOSPITAL | Age: 68
Setting detail: THERAPIES SERIES
Discharge: HOME | End: 2018-02-19

## 2018-02-19 DIAGNOSIS — M72.2 BILATERAL PLANTAR FASCIITIS: Primary | ICD-10-CM

## 2018-02-19 PROCEDURE — 97163 PT EVAL HIGH COMPLEX 45 MIN: CPT

## 2018-02-19 PROCEDURE — G8979 MOBILITY GOAL STATUS: HCPCS

## 2018-02-19 PROCEDURE — G8978 MOBILITY CURRENT STATUS: HCPCS

## 2018-02-20 NOTE — TELEPHONE ENCOUNTER
Called pt and advised. Pt verbalized understanding. I stressed to the pt to pls continue meds as prescribed. Pt verbalized understanding and stated she will continue meds as prescribed.

## 2018-02-21 ENCOUNTER — TRANSCRIBE ORDERS (OUTPATIENT)
Dept: PHYSICAL THERAPY | Facility: HOSPITAL | Age: 68
End: 2018-02-21

## 2018-02-21 DIAGNOSIS — M72.2 PLANTAR FASCIITIS: Primary | ICD-10-CM

## 2018-02-23 ENCOUNTER — APPOINTMENT (OUTPATIENT)
Dept: PHYSICAL THERAPY | Facility: HOSPITAL | Age: 68
End: 2018-02-23

## 2018-02-27 ENCOUNTER — TELEPHONE (OUTPATIENT)
Dept: FAMILY MEDICINE CLINIC | Facility: CLINIC | Age: 68
End: 2018-02-27

## 2018-02-27 DIAGNOSIS — F41.9 ANXIETY AND DEPRESSION: ICD-10-CM

## 2018-02-27 DIAGNOSIS — F31.9 BIPOLAR AFFECTIVE DISORDER, REMISSION STATUS UNSPECIFIED (HCC): ICD-10-CM

## 2018-02-27 DIAGNOSIS — F32.A ANXIETY AND DEPRESSION: ICD-10-CM

## 2018-02-27 RX ORDER — QUETIAPINE FUMARATE 50 MG/1
50 TABLET, FILM COATED ORAL 2 TIMES DAILY
Qty: 60 TABLET | Refills: 5 | Status: SHIPPED | OUTPATIENT
Start: 2018-02-27 | End: 2018-08-09

## 2018-02-27 NOTE — TELEPHONE ENCOUNTER
Patient called for refill on her seroquel 50mg tabs,reports she has always taken 150mg at hs & 50mg Q AM but only got 30 of the 50mg tablets?

## 2018-03-12 ENCOUNTER — DOCUMENTATION (OUTPATIENT)
Dept: PHYSICAL THERAPY | Facility: HOSPITAL | Age: 68
End: 2018-03-12

## 2018-03-12 DIAGNOSIS — M72.2 BILATERAL PLANTAR FASCIITIS: Primary | ICD-10-CM

## 2018-03-12 NOTE — THERAPY DISCHARGE NOTE
Outpatient Physical Therapy Discharge Summary         Patient Name: Hamida Cazares  : 1950  MRN: [id number]    Today's Date: 3/12/2018    Visit Dx:    ICD-10-CM ICD-9-CM   1. Bilateral plantar fasciitis M72.2 728.71           OP PT Discharge Summary  Date of Discharge: 18  Reason for Discharge: Patient/Caregiver request  Outcomes Achieved: Unable to tolerate or actively participate in therapy  Discharge Destination: Home with home program  Discharge Instructions/Additional Comments: Pt has requested to be discharged from therapy at this time.      Time Calculation:                    Chinmay Kumar, PT  3/12/2018

## 2018-03-14 ENCOUNTER — OFFICE VISIT (OUTPATIENT)
Dept: FAMILY MEDICINE CLINIC | Facility: CLINIC | Age: 68
End: 2018-03-14

## 2018-03-14 VITALS
OXYGEN SATURATION: 98 % | BODY MASS INDEX: 20.25 KG/M2 | SYSTOLIC BLOOD PRESSURE: 127 MMHG | WEIGHT: 126 LBS | DIASTOLIC BLOOD PRESSURE: 86 MMHG | HEART RATE: 93 BPM | HEIGHT: 66 IN

## 2018-03-14 DIAGNOSIS — E78.5 HYPERLIPIDEMIA, UNSPECIFIED HYPERLIPIDEMIA TYPE: ICD-10-CM

## 2018-03-14 DIAGNOSIS — Z20.2 POTENTIAL EXPOSURE TO STD: Primary | ICD-10-CM

## 2018-03-14 DIAGNOSIS — Z11.3 ENCOUNTER FOR SCREENING FOR INFECTIONS WITH PREDOMINANTLY SEXUAL MODE OF TRANSMISSION: ICD-10-CM

## 2018-03-14 DIAGNOSIS — F31.9 BIPOLAR AFFECTIVE DISORDER, REMISSION STATUS UNSPECIFIED (HCC): ICD-10-CM

## 2018-03-14 DIAGNOSIS — G89.29 CHRONIC PAIN OF RIGHT KNEE: ICD-10-CM

## 2018-03-14 DIAGNOSIS — M25.561 CHRONIC PAIN OF RIGHT KNEE: ICD-10-CM

## 2018-03-14 LAB
CHOLEST SERPL-MCNC: 240 MG/DL (ref 0–200)
HAV IGM SERPL QL IA: NORMAL
HBV CORE IGM SERPL QL IA: NORMAL
HBV SURFACE AG SERPL QL IA: NORMAL
HCV AB SER DONR QL: NORMAL
HDLC SERPL-MCNC: 72 MG/DL (ref 60–100)
HIV1+2 AB SER QL: NORMAL
LDLC SERPL CALC-MCNC: 144 MG/DL (ref 0–100)
LDLC/HDLC SERPL: 2 {RATIO}
TRIGL SERPL-MCNC: 119 MG/DL (ref 0–150)
VALPROATE SERPL-MCNC: 58.4 MCG/ML (ref 50–100)
VLDLC SERPL-MCNC: 23.8 MG/DL

## 2018-03-14 PROCEDURE — 36415 COLL VENOUS BLD VENIPUNCTURE: CPT | Performed by: NURSE PRACTITIONER

## 2018-03-14 PROCEDURE — G0432 EIA HIV-1/HIV-2 SCREEN: HCPCS | Performed by: NURSE PRACTITIONER

## 2018-03-14 PROCEDURE — 80164 ASSAY DIPROPYLACETIC ACD TOT: CPT | Performed by: NURSE PRACTITIONER

## 2018-03-14 PROCEDURE — 80074 ACUTE HEPATITIS PANEL: CPT | Performed by: NURSE PRACTITIONER

## 2018-03-14 PROCEDURE — 99213 OFFICE O/P EST LOW 20 MIN: CPT | Performed by: NURSE PRACTITIONER

## 2018-03-14 PROCEDURE — 80061 LIPID PANEL: CPT | Performed by: NURSE PRACTITIONER

## 2018-03-14 NOTE — PROGRESS NOTES
Pt is negative for HIV and Hepatitis.   Her depakote level is normal.  Her cholesterol level is elevated and I suggest atorvastatin 20mg nightly.  Is she agreeable?   We are awaiting NuSwab results which could take up to a week to get results back.   Please let her know.

## 2018-03-14 NOTE — PROGRESS NOTES
Subjective   Hamida Cazares is a 67 y.o. female.     Chief Complaint: Exposure to STD (Wishes to have labs drawn today to check for STD) and Hyperlipidemia    Knee Pain    The incident occurred more than 1 week ago. The injury mechanism was a direct blow. The pain is present in the right knee. The pain is moderate. The pain has been intermittent since onset. She reports no foreign bodies present. Nothing aggravates the symptoms. She has tried nothing for the symptoms.   Pt states that that a gentlemen fell on her right knee two years ago and she has had issues with knee pain since that time.  She states that certain ways she turns her leg causes her knee cap to move and she has to move the knee cap back in place and it is very painful.  She denies any N/T in the extremity.  She is not having any pain today.     Pt is requesting to be tested for STD exposure.  She would like to be checked for HIV and Hepatitis.  She states that she was checked about 4 years ago but would like to be tested again at this time.  She states that she is not sexually active at this time. Pt had never had labs drawn from previous visit and would like to have them done today.  She would also like to be checked for chlamydia and gonorrhea today. She denies any vaginal discharge or bleeding; no pelvic pain. Denies fever, N/V/D.     Family History   Problem Relation Age of Onset   • Heart failure Mother    • Lung cancer Father        Social History     Social History   • Marital status: Single     Spouse name: N/A   • Number of children: N/A   • Years of education: N/A     Occupational History   • Not on file.     Social History Main Topics   • Smoking status: Heavy Tobacco Smoker     Packs/day: 0.50     Types: Cigarettes   • Smokeless tobacco: Never Used   • Alcohol use No   • Drug use: No   • Sexual activity: Defer     Other Topics Concern   • Not on file     Social History Narrative   • No narrative on file       Past Medical History:  "  Diagnosis Date   • Anxiety    • Arthritis    • Back pain    • Bipolar disorder    • Depression    • Gastritis    • GERD (gastroesophageal reflux disease)    • Heel spur        Review of Systems   Constitutional: Negative.    HENT: Negative.    Respiratory: Negative.    Cardiovascular: Negative.    Gastrointestinal: Negative.    Musculoskeletal:        Right knee pain   Skin: Negative.    Neurological: Negative.    Psychiatric/Behavioral: Negative.        Objective   Physical Exam   Constitutional: She is oriented to person, place, and time. She appears well-developed and well-nourished.   Neck: Normal range of motion. Neck supple.   Cardiovascular: Normal rate, regular rhythm and normal heart sounds.    Pulmonary/Chest: Effort normal and breath sounds normal.   Musculoskeletal: Normal range of motion. She exhibits no edema, tenderness or deformity.   Neurological: She is alert and oriented to person, place, and time.   Skin: Skin is warm and dry.   Psychiatric: She has a normal mood and affect. Her behavior is normal. Judgment and thought content normal.   Nursing note and vitals reviewed.      Procedures    Vitals: Blood pressure 127/86, pulse 93, height 166.4 cm (65.5\"), weight 57.2 kg (126 lb), SpO2 98 %, not currently breastfeeding.    Allergies:   Allergies   Allergen Reactions   • Codeine    • Morphine And Related    • Sulfa Antibiotics           Assessment/Plan   Hamida was seen today for exposure to std and hyperlipidemia.    Diagnoses and all orders for this visit:    Potential exposure to STD  -     NuSwab VG+ - Swab, Vagina    Chronic pain of right knee  -     XR Knee 3 View Right; Future    Encounter for screening for infections with predominantly sexual mode of transmission   -     NuSwab VG+ - Swab, Vagina               "

## 2018-03-15 ENCOUNTER — TELEPHONE (OUTPATIENT)
Dept: FAMILY MEDICINE CLINIC | Facility: CLINIC | Age: 68
End: 2018-03-15

## 2018-03-15 NOTE — TELEPHONE ENCOUNTER
----- Message from RAJESH Lorenzo sent at 3/14/2018  5:52 PM EDT -----  Pt is negative for HIV and Hepatitis.   Her depakote level is normal.  Her cholesterol level is elevated and I suggest atorvastatin 20mg nightly.  Is she agreeable?   We are awaiting Nuab results which could take up to a week to get results back.   Please let her know.      Left  A message to return call.      Left a message to return call.    Patient still not available,letter mailed to call the office.

## 2018-03-17 LAB
A VAGINAE DNA VAG QL NAA+PROBE: ABNORMAL SCORE
BVAB2 DNA VAG QL NAA+PROBE: ABNORMAL SCORE
C ALBICANS DNA VAG QL NAA+PROBE: POSITIVE
C GLABRATA DNA VAG QL NAA+PROBE: NEGATIVE
C TRACH RRNA SPEC QL NAA+PROBE: NEGATIVE
MEGA1 DNA VAG QL NAA+PROBE: ABNORMAL SCORE
N GONORRHOEA RRNA SPEC QL NAA+PROBE: NEGATIVE
T VAGINALIS RRNA SPEC QL NAA+PROBE: NEGATIVE

## 2018-03-20 ENCOUNTER — TELEPHONE (OUTPATIENT)
Dept: FAMILY MEDICINE CLINIC | Facility: CLINIC | Age: 68
End: 2018-03-20

## 2018-03-20 RX ORDER — FLUCONAZOLE 150 MG/1
150 TABLET ORAL ONCE
Qty: 1 TABLET | Refills: 0 | Status: SHIPPED | OUTPATIENT
Start: 2018-03-20 | End: 2018-03-20

## 2018-03-20 NOTE — PROGRESS NOTES
Patient is positive for yeast.  Everything appears to be normal.  I have sent a script to the pharmacy for Diflucan x1 tablet.

## 2018-03-20 NOTE — TELEPHONE ENCOUNTER
----- Message from RAJESH Lorenzo sent at 3/20/2018  8:49 AM EDT -----  Patient is positive for yeast.  Everything appears to be normal.  I have sent a script to the pharmacy for Diflucan x1 tablet.        Still unable to reach patient,mailed a letter on 3/19/18 for her to call us,will await her call.

## 2018-03-21 ENCOUNTER — TELEPHONE (OUTPATIENT)
Dept: FAMILY MEDICINE CLINIC | Facility: CLINIC | Age: 68
End: 2018-03-21

## 2018-03-21 RX ORDER — ATORVASTATIN CALCIUM 20 MG/1
20 TABLET, FILM COATED ORAL NIGHTLY
Qty: 30 TABLET | Refills: 5 | Status: SHIPPED | OUTPATIENT
Start: 2018-03-21 | End: 2019-07-09 | Stop reason: SINTOL

## 2018-03-21 NOTE — TELEPHONE ENCOUNTER
Patient returned call after receiving a letter verbalized understanding of labs,is agreeable to Atorvastatin,sent to pharmacy. Verified her phone number as correct.

## 2018-03-22 ENCOUNTER — OFFICE VISIT (OUTPATIENT)
Dept: FAMILY MEDICINE CLINIC | Facility: CLINIC | Age: 68
End: 2018-03-22

## 2018-03-22 VITALS
SYSTOLIC BLOOD PRESSURE: 121 MMHG | OXYGEN SATURATION: 99 % | BODY MASS INDEX: 20.57 KG/M2 | HEART RATE: 88 BPM | TEMPERATURE: 98.2 F | DIASTOLIC BLOOD PRESSURE: 81 MMHG | HEIGHT: 66 IN | WEIGHT: 128 LBS

## 2018-03-22 DIAGNOSIS — H66.93 OTITIS OF BOTH EARS: Primary | ICD-10-CM

## 2018-03-22 PROCEDURE — 99213 OFFICE O/P EST LOW 20 MIN: CPT | Performed by: NURSE PRACTITIONER

## 2018-03-22 RX ORDER — CEFDINIR 300 MG/1
300 CAPSULE ORAL DAILY
Qty: 7 CAPSULE | Refills: 0 | Status: SHIPPED | OUTPATIENT
Start: 2018-03-22 | End: 2018-04-17

## 2018-03-22 NOTE — PROGRESS NOTES
"Subjective   Hamida Cazares is a 67 y.o. female.     Chief Complaint: Earache    Dizziness   This is a chronic problem. The current episode started more than 1 year ago. The problem occurs daily. The problem has been unchanged. Associated symptoms comments: Ears feel full. Nothing aggravates the symptoms. She has tried nothing for the symptoms.   Pt has been adamant about receiving an antibiotic today.  She is convinced that she has an ear infection.  I have discussed with her today that she does not have an ear infection.  There is no redness nor inflammation noted in either ear.  She does have a minimal amount of fluid behind right TM and this is the only abnormality that I can visually see today.  She does not have any lymphadenopathy today.  She is very upset with the ENT that she saw yesterday because he wanted to do \"some tests\" and she is not agreeable to doing tests.    After long discussion with patient today, I have agreed to write a prescription for Cefdinir once daily for one week.  However, I have been very direct with patient with the risks of unnecessary antibiotics and antibiotic resistance.  Pt states understanding but is still adamant about antibiotic.   I have informed her that the antibiotic will not help with her dizziness symptom.     Family History   Problem Relation Age of Onset   • Heart failure Mother    • Lung cancer Father        Social History     Social History   • Marital status: Single     Spouse name: N/A   • Number of children: N/A   • Years of education: N/A     Occupational History   • Not on file.     Social History Main Topics   • Smoking status: Heavy Tobacco Smoker     Packs/day: 0.50     Types: Cigarettes   • Smokeless tobacco: Never Used   • Alcohol use No   • Drug use: No   • Sexual activity: Defer     Other Topics Concern   • Not on file     Social History Narrative   • No narrative on file       Past Medical History:   Diagnosis Date   • Anxiety    • Arthritis    • Back " "pain    • Bipolar disorder    • Depression    • Gastritis    • GERD (gastroesophageal reflux disease)    • Heel spur        Review of Systems   Constitutional: Negative.    HENT: Negative.    Respiratory: Negative.    Cardiovascular: Negative.    Gastrointestinal: Negative.    Musculoskeletal: Negative.    Skin: Negative.    Neurological: Positive for dizziness.   Psychiatric/Behavioral: Negative.        Objective   Physical Exam   Constitutional: She is oriented to person, place, and time. She appears well-developed and well-nourished.   HENT:   Right Ear: External ear normal.   Left Ear: External ear normal.   Mouth/Throat: Oropharynx is clear and moist.   Fluid left TM   Eyes: Conjunctivae are normal. Pupils are equal, round, and reactive to light.   Neck: Normal range of motion. Neck supple.   Cardiovascular: Normal rate, regular rhythm and normal heart sounds.    Pulmonary/Chest: Effort normal and breath sounds normal.   Neurological: She is alert and oriented to person, place, and time.   Skin: Skin is warm and dry.   Psychiatric: She has a normal mood and affect. Her behavior is normal. Judgment and thought content normal.   Nursing note and vitals reviewed.      Procedures    Vitals: Blood pressure 121/81, pulse 88, temperature 98.2 °F (36.8 °C), temperature source Tympanic, height 166.4 cm (65.5\"), weight 58.1 kg (128 lb), SpO2 99 %, not currently breastfeeding.    Allergies:   Allergies   Allergen Reactions   • Codeine    • Morphine And Related    • Sulfa Antibiotics           Assessment/Plan   Hamida was seen today for earache.    Diagnoses and all orders for this visit:    Otitis of both ears  -     cefdinir (OMNICEF) 300 MG capsule; Take 1 capsule by mouth Daily.               "

## 2018-03-30 ENCOUNTER — OFFICE VISIT (OUTPATIENT)
Dept: FAMILY MEDICINE CLINIC | Facility: CLINIC | Age: 68
End: 2018-03-30

## 2018-03-30 VITALS
HEIGHT: 66 IN | OXYGEN SATURATION: 97 % | BODY MASS INDEX: 21.05 KG/M2 | DIASTOLIC BLOOD PRESSURE: 77 MMHG | WEIGHT: 131 LBS | HEART RATE: 83 BPM | SYSTOLIC BLOOD PRESSURE: 114 MMHG

## 2018-03-30 DIAGNOSIS — R30.0 DYSURIA: Primary | ICD-10-CM

## 2018-03-30 DIAGNOSIS — Z53.21 PATIENT LEFT WITHOUT BEING SEEN: ICD-10-CM

## 2018-03-30 LAB
BILIRUB BLD-MCNC: NEGATIVE MG/DL
CLARITY, POC: ABNORMAL
COLOR UR: YELLOW
GLUCOSE UR STRIP-MCNC: NEGATIVE MG/DL
KETONES UR QL: NEGATIVE
LEUKOCYTE EST, POC: ABNORMAL
NITRITE UR-MCNC: NEGATIVE MG/ML
PH UR: 6 [PH] (ref 5–8)
PROT UR STRIP-MCNC: NEGATIVE MG/DL
RBC # UR STRIP: NEGATIVE /UL
SP GR UR: 1.01 (ref 1–1.03)
UROBILINOGEN UR QL: NORMAL

## 2018-03-30 PROCEDURE — 99024 POSTOP FOLLOW-UP VISIT: CPT | Performed by: FAMILY MEDICINE

## 2018-03-30 PROCEDURE — 81003 URINALYSIS AUTO W/O SCOPE: CPT | Performed by: FAMILY MEDICINE

## 2018-04-17 ENCOUNTER — OFFICE VISIT (OUTPATIENT)
Dept: FAMILY MEDICINE CLINIC | Facility: CLINIC | Age: 68
End: 2018-04-17

## 2018-04-17 VITALS
HEART RATE: 92 BPM | BODY MASS INDEX: 21.83 KG/M2 | HEIGHT: 65 IN | SYSTOLIC BLOOD PRESSURE: 137 MMHG | DIASTOLIC BLOOD PRESSURE: 95 MMHG | OXYGEN SATURATION: 99 % | WEIGHT: 131 LBS

## 2018-04-17 DIAGNOSIS — M72.2 PLANTAR FASCIITIS: Primary | ICD-10-CM

## 2018-04-17 PROCEDURE — 99213 OFFICE O/P EST LOW 20 MIN: CPT | Performed by: NURSE PRACTITIONER

## 2018-04-17 NOTE — PATIENT INSTRUCTIONS
Dyslipidemia  Dyslipidemia is an imbalance of waxy, fat-like substances (lipids) in the blood. The body needs lipids in small amounts. Dyslipidemia often involves a high level of cholesterol or triglycerides, which are types of lipids.  Common forms of dyslipidemia include:  · High levels of bad cholesterol (LDL cholesterol). LDL is the type of cholesterol that causes fatty deposits (plaques) to build up in the blood vessels that carry blood away from your heart (arteries).  · Low levels of good cholesterol (HDL cholesterol). HDL cholesterol is the type of cholesterol that protects against heart disease. High levels of HDL remove the LDL buildup from arteries.  · High levels of triglycerides. Triglycerides are a fatty substance in the blood that is linked to a buildup of plaques in the arteries.  You can develop dyslipidemia because of the genes you are born with (primary dyslipidemia) or changes that occur during your life (secondary dyslipidemia), or as a side effect of certain medical treatments.  What are the causes?  Primary dyslipidemia is caused by changes (mutations) in genes that are passed down through families (inherited). These mutations cause several types of dyslipidemia. Mutations can result in disorders that make the body produce too much LDL cholesterol or triglycerides, or not enough HDL cholesterol. These disorders may lead to heart disease, arterial disease, or stroke at an early age.  Causes of secondary dyslipidemia include certain lifestyle choices and diseases that lead to dyslipidemia, such as:  · Eating a diet that is high in animal fat.  · Not getting enough activity or exercise (having a sedentary lifestyle).  · Having diabetes, kidney disease, liver disease, or thyroid disease.  · Drinking large amounts of alcohol.  · Using certain types of drugs.  What increases the risk?  You may be at greater risk for dyslipidemia if you are an older man or if you are a woman who has gone through  menopause. Other risk factors include:  · Having a family history of dyslipidemia.  · Taking certain medicines, including birth control pills, steroids, some diuretics, beta-blockers, and some medicines for HIV.  · Smoking cigarettes.  · Eating a high-fat diet.  · Drinking large amounts of alcohol.  · Having certain medical conditions such as diabetes, polycystic ovary syndrome (PCOS), pregnancy, kidney disease, liver disease, or hypothyroidism.  · Not exercising regularly.  · Being overweight or obese with too much belly fat.  What are the signs or symptoms?  Dyslipidemia does not usually cause any symptoms.  Very high lipid levels can cause fatty bumps under the skin (xanthomas) or a white or gray ring around the black center (pupil) of the eye. Very high triglyceride levels can cause inflammation of the pancreas (pancreatitis).  How is this diagnosed?  Your health care provider may diagnose dyslipidemia based on a routine blood test (fasting blood test). Because most people do not have symptoms of the condition, this blood testing (lipid profile) is done on adults age 20 and older and is repeated every 5 years. This test checks:  · Total cholesterol. This is a measure of the total amount of cholesterol in your blood, including LDL cholesterol, HDL cholesterol, and triglycerides. A healthy number is below 200.  · LDL cholesterol. The target number for LDL cholesterol is different for each person, depending on individual risk factors. For most people, a number below 100 is healthy. Ask your health care provider what your LDL cholesterol number should be.  · HDL cholesterol. An HDL level of 60 or higher is best because it helps to protect against heart disease. A number below 40 for men or below 50 for women increases the risk for heart disease.  · Triglycerides. A healthy triglyceride number is below 150.  If your lipid profile is abnormal, your health care provider may do other blood tests to get more information  about your condition.  How is this treated?  Treatment depends on the type of dyslipidemia that you have and your other risk factors for heart disease and stroke. Your health care provider will have a target range for your lipid levels based on this information.  For many people, treatment starts with lifestyle changes, such as diet and exercise. Your health care provider may recommend that you:  · Get regular exercise.  · Make changes to your diet.  · Quit smoking if you smoke.  If diet changes and exercise do not help you reach your goals, your health care provider may also prescribe medicine to lower lipids. The most commonly prescribed type of medicine lowers your LDL cholesterol (statin drug). If you have a high triglyceride level, your provider may prescribe another type of drug (fibrate) or an omega-3 fish oil supplement, or both.  Follow these instructions at home:  · Take over-the-counter and prescription medicines only as told by your health care provider. This includes supplements.  · Get regular exercise. Start an aerobic exercise and strength training program as told by your health care provider. Ask your health care provider what activities are safe for you. Your health care provider may recommend:  ¨ 30 minutes of aerobic activity 4-6 days a week. Brisk walking is an example of aerobic activity.  ¨ Strength training 2 days a week.  · Eat a healthy diet as told by your health care provider. This can help you reach and maintain a healthy weight, lower your LDL cholesterol, and raise your HDL cholesterol. It may help to work with a diet and nutrition specialist (dietitian) to make a plan that is right for you. Your dietitian or health care provider may recommend:  ¨ Limiting your calories, if you are overweight.  ¨ Eating more fruits, vegetables, whole grains, fish, and lean meats.  ¨ Limiting saturated fat, trans fat, and cholesterol.  · Follow instructions from your health care provider or dietitian  about eating or drinking restrictions.  · Limit alcohol intake to no more than one drink per day for nonpregnant women and two drinks per day for men. One drink equals 12 oz of beer, 5 oz of wine, or 1½ oz of hard liquor.  · Do not use any products that contain nicotine or tobacco, such as cigarettes and e-cigarettes. If you need help quitting, ask your health care provider.  · Keep all follow-up visits as told by your health care provider. This is important.  Contact a health care provider if:  · You are having trouble sticking to your exercise or diet plan.  · You are struggling to quit smoking or control your use of alcohol.  Summary  · Dyslipidemia is an imbalance of waxy, fat-like substances (lipids) in the blood. The body needs lipids in small amounts. Dyslipidemia often involves a high level of cholesterol or triglycerides, which are types of lipids.  · Treatment depends on the type of dyslipidemia that you have and your other risk factors for heart disease and stroke.  · For many people, treatment starts with lifestyle changes, such as diet and exercise. Your health care provider may also prescribe medicine to lower lipids.  This information is not intended to replace advice given to you by your health care provider. Make sure you discuss any questions you have with your health care provider.  Document Released: 12/23/2014 Document Revised: 08/14/2017 Document Reviewed: 08/14/2017  [employer] Interactive Patient Education © 2017 [employer]  Fat and Cholesterol Restricted Diet  High levels of fat and cholesterol in your blood may lead to various health problems, such as diseases of the heart, blood vessels, gallbladder, liver, and pancreas. Fats are concentrated sources of energy that come in various forms. Certain types of fat, including saturated fat, may be harmful in excess. Cholesterol is a substance needed by your body in small amounts. Your body makes all the cholesterol it needs. Excess cholesterol  "comes from the food you eat.  When you have high levels of cholesterol and saturated fat in your blood, health problems can develop because the excess fat and cholesterol will gather along the walls of your blood vessels, causing them to narrow. Choosing the right foods will help you control your intake of fat and cholesterol. This will help keep the levels of these substances in your blood within normal limits and reduce your risk of disease.  What is my plan?  Your health care provider recommends that you:  · Limit your fat intake to ______% or less of your total calories per day.  · Limit the amount of cholesterol in your diet to less than _________mg per day.  · Eat 20-30 grams of fiber each day.  What types of fat should I choose?  · Choose healthy fats more often. Choose monounsaturated and polyunsaturated fats, such as olive and canola oil, flaxseeds, walnuts, almonds, and seeds.  · Eat more omega-3 fats. Good choices include salmon, mackerel, sardines, tuna, flaxseed oil, and ground flaxseeds. Aim to eat fish at least two times a week.  · Limit saturated fats. Saturated fats are primarily found in animal products, such as meats, butter, and cream. Plant sources of saturated fats include palm oil, palm kernel oil, and coconut oil.  · Avoid foods with partially hydrogenated oils in them. These contain trans fats. Examples of foods that contain trans fats are stick margarine, some tub margarines, cookies, crackers, and other baked goods.  What general guidelines do I need to follow?  These guidelines for healthy eating will help you control your intake of fat and cholesterol:  · Check food labels carefully to identify foods with trans fats or high amounts of saturated fat.  · Fill one half of your plate with vegetables and green salads.  · Fill one fourth of your plate with whole grains. Look for the word \"whole\" as the first word in the ingredient list.  · Fill one fourth of your plate with lean protein " foods.  · Limit fruit to two servings a day. Choose fruit instead of juice.  · Eat more foods that contain fiber, such as apples, broccoli, carrots, beans, peas, and barley.  · Eat more home-cooked food and less restaurant, buffet, and fast food.  · Limit or avoid alcohol.  · Limit foods high in starch and sugar.  · Limit fried foods.  · Cook foods using methods other than frying. Baking, boiling, grilling, and broiling are all great options.  · Lose weight if you are overweight. Losing just 5-10% of your initial body weight can help your overall health and prevent diseases such as diabetes and heart disease.  What foods can I eat?  Grains   · Whole grains, such as whole wheat or whole grain breads, crackers, cereals, and pasta. Unsweetened oatmeal, bulgur, barley, quinoa, or brown rice. Corn or whole wheat flour tortillas.  Vegetables   · Fresh or frozen vegetables (raw, steamed, roasted, or grilled). Green salads.  Fruits   · All fresh, canned (in natural juice), or frozen fruits.  Meats and other protein foods   · Ground beef (85% or leaner), grass-fed beef, or beef trimmed of fat. Skinless chicken or turkey. Ground chicken or turkey. Pork trimmed of fat. All fish and seafood. Eggs. Dried beans, peas, or lentils. Unsalted nuts or seeds. Unsalted canned or dry beans.  Dairy   · Low-fat dairy products, such as skim or 1% milk, 2% or reduced-fat cheeses, low-fat ricotta or cottage cheese, or plain low-fat yo  Fats and oils   · Tub margarines without trans fats. Light or reduced-fat mayonnaise and salad dressings. Avocado. Olive, canola, sesame, or safflower oils. Natural peanut or almond butter (choose ones without added sugar and oil).  The items listed above may not be a complete list of recommended foods or beverages. Contact your dietitian for more options.   Foods to avoid  Grains   · White bread. White pasta. White rice. Cornbread. Bagels, pastries, and croissants. Crackers that contain trans fat.  Vegetables    · White potatoes. Corn. Creamed or fried vegetables. Vegetables in a cheese sauce.  Fruits   · Dried fruits. Canned fruit in light or heavy syrup. Fruit juice.  Meats and other protein foods   · Fatty cuts of meat. Ribs, chicken wings, valle, sausage, bologna, salami, chitterlings, fatback, hot dogs, bratwurst, and packaged luncheon meats. Liver and organ meats.  Dairy   · Whole or 2% milk, cream, half-and-half, and cream cheese. Whole milk cheeses. Whole-fat or sweetened yogurt. Full-fat cheeses. Nondairy creamers and whipped toppings. Processed cheese, cheese spreads, or cheese curds.  Beverages   · Alcohol. Sweetened drinks (such as sodas, lemonade, and fruit drinks or punches).  Fats and oils   · Butter, stick margarine, lard, shortening, ghee, or valle fat. Coconut, palm kernel, or palm oils.  Sweets and desserts   · Corn syrup, sugars, honey, and molasses. Candy. Jam and jelly. Syrup. Sweetened cereals. Cookies, pies, cakes, donuts, muffins, and ice cream.  The items listed above may not be a complete list of foods and beverages to avoid. Contact your dietitian for more information.   This information is not intended to replace advice given to you by your health care provider. Make sure you discuss any questions you have with your health care provider.  Document Released: 12/18/2006 Document Revised: 01/08/2016 Document Reviewed: 03/18/2015  [employer] Interactive Patient Education © 2017 [employer]

## 2018-04-17 NOTE — PROGRESS NOTES
Subjective   Hamida Cazares is a 67 y.o. female.     Chief Complaint: Foot Pain (bliateral )    Lower Extremity Issue   This is a chronic problem. The current episode started more than 1 year ago. The problem occurs daily. The problem has been waxing and waning. Associated symptoms comments: Bilateral foot pain that radiates up legs; worse at night. The symptoms are aggravated by walking. Treatments tried: pt has had physical therapy for the past 6 weeks without any relief; she has been seeing Dr. An and is not happy with her care; she would like to be referred to another provider for her foot pain.   Pt has requested that I order her surgery today.  I have explained to patient today that she will need to return to Dr An for evaluation and see if he thinks surgery is her best option.  She does not want him to do any surgery on her.  After discussion, patient would like to be referred to another podiatrist for evaluation and possible intervention.  I have agreed to refer her to [facility] here in [place].       Family History   Problem Relation Age of Onset   • Heart failure Mother    • Lung cancer Father        Social History     Social History   • Marital status: Single     Spouse name: N/A   • Number of children: N/A   • Years of education: N/A     Occupational History   • Not on file.     Social History Main Topics   • Smoking status: Heavy Tobacco Smoker     Packs/day: 0.50     Types: Cigarettes   • Smokeless tobacco: Never Used   • Alcohol use No   • Drug use: No   • Sexual activity: Defer     Other Topics Concern   • Not on file     Social History Narrative   • No narrative on file       Past Medical History:   Diagnosis Date   • Anxiety    • Arthritis    • Back pain    • Bipolar disorder    • Depression    • Gastritis    • GERD (gastroesophageal reflux disease)    • Heel spur        Review of Systems   Constitutional: Negative.    HENT: Negative.    Respiratory: Negative.    Cardiovascular:  "Negative.    Gastrointestinal: Negative.    Musculoskeletal:        Bilateral foot pain   Skin: Negative.    Neurological: Negative.    Psychiatric/Behavioral: Negative.        Objective   Physical Exam   Constitutional: She is oriented to person, place, and time. She appears well-developed and well-nourished.   Neck: Normal range of motion. Neck supple.   Cardiovascular: Normal rate, regular rhythm and normal heart sounds.    Pulmonary/Chest: Effort normal and breath sounds normal.   Neurological: She is alert and oriented to person, place, and time.   Skin: Skin is warm and dry.   Psychiatric: She has a normal mood and affect. Her behavior is normal. Judgment and thought content normal.   Nursing note and vitals reviewed.      Procedures    Vitals: Blood pressure 137/95, pulse 92, height 165.1 cm (65\"), weight 59.4 kg (131 lb), SpO2 99 %, not currently breastfeeding.    Allergies:   Allergies   Allergen Reactions   • Cefdinir Shortness Of Breath   • Codeine    • Lyrica [Pregabalin] Mental Status Change     pts stated lyrica gave her horrible dreams    • Morphine And Related    • Sulfa Antibiotics         During this visit the following were done:  Labs Reviewed []    Labs Ordered []    Radiology Reports Reviewed []    Radiology Ordered []    PCP Records Reviewed []    Referring Provider Records Reviewed []    ER Records Reviewed []    Hospital Records Reviewed []    History Obtained From Family []    Radiology Images Reviewed []    Other Reviewed []    Records Requested []      Assessment/Plan   Hamida was seen today for foot pain.    Diagnoses and all orders for this visit:    Plantar fasciitis  -     Ambulatory Referral to Podiatry               "

## 2018-05-23 ENCOUNTER — TELEPHONE (OUTPATIENT)
Dept: FAMILY MEDICINE CLINIC | Facility: CLINIC | Age: 68
End: 2018-05-23

## 2018-05-23 NOTE — TELEPHONE ENCOUNTER
Pt called and stated Dr. An wrote her a script for amitriptyline. Pt wanted to check with Lillian before taking it. Pt is concerned about interactions to her other medications.

## 2018-06-18 ENCOUNTER — HOSPITAL ENCOUNTER (OUTPATIENT)
Dept: GENERAL RADIOLOGY | Facility: HOSPITAL | Age: 68
Discharge: HOME | End: 2018-06-18
Admitting: NURSE PRACTITIONER

## 2018-06-18 DIAGNOSIS — G89.29 CHRONIC PAIN OF RIGHT KNEE: ICD-10-CM

## 2018-06-18 DIAGNOSIS — M25.561 CHRONIC PAIN OF RIGHT KNEE: ICD-10-CM

## 2018-06-18 PROCEDURE — 73562 X-RAY EXAM OF KNEE 3: CPT

## 2018-06-18 PROCEDURE — 73562 X-RAY EXAM OF KNEE 3: CPT | Performed by: RADIOLOGY

## 2018-06-19 ENCOUNTER — OFFICE VISIT (OUTPATIENT)
Dept: FAMILY MEDICINE CLINIC | Facility: CLINIC | Age: 68
End: 2018-06-19

## 2018-06-19 VITALS
SYSTOLIC BLOOD PRESSURE: 103 MMHG | WEIGHT: 132 LBS | HEART RATE: 93 BPM | OXYGEN SATURATION: 98 % | HEIGHT: 65 IN | BODY MASS INDEX: 21.99 KG/M2 | DIASTOLIC BLOOD PRESSURE: 73 MMHG

## 2018-06-19 DIAGNOSIS — G89.29 CHRONIC PAIN OF RIGHT KNEE: Primary | ICD-10-CM

## 2018-06-19 DIAGNOSIS — M25.561 CHRONIC PAIN OF RIGHT KNEE: Primary | ICD-10-CM

## 2018-06-19 DIAGNOSIS — F31.31 BIPOLAR AFFECTIVE DISORDER, CURRENTLY DEPRESSED, MILD (HCC): ICD-10-CM

## 2018-06-19 PROCEDURE — 99213 OFFICE O/P EST LOW 20 MIN: CPT | Performed by: NURSE PRACTITIONER

## 2018-06-19 NOTE — PROGRESS NOTES
Subjective   Hamida Cazares is a 67 y.o. female.     Chief Complaint: Knee Pain (right )    Knee Pain    The incident occurred more than 1 week ago. The injury mechanism is unknown. The pain is present in the right knee. The pain is moderate. The pain has been intermittent since onset. She reports no foreign bodies present. The symptoms are aggravated by weight bearing and movement.   Pt states that when she moves her leg a certain way her knee will pop out of place.     Family History   Problem Relation Age of Onset   • Heart failure Mother    • Lung cancer Father        Social History     Social History   • Marital status: Single     Spouse name: N/A   • Number of children: N/A   • Years of education: N/A     Occupational History   • Not on file.     Social History Main Topics   • Smoking status: Heavy Tobacco Smoker     Packs/day: 0.50     Types: Cigarettes   • Smokeless tobacco: Never Used   • Alcohol use No   • Drug use: No   • Sexual activity: Defer     Other Topics Concern   • Not on file     Social History Narrative   • No narrative on file       Past Medical History:   Diagnosis Date   • Anxiety    • Arthritis    • Back pain    • Bipolar disorder    • Depression    • Gastritis    • GERD (gastroesophageal reflux disease)    • Heel spur        Review of Systems   Constitutional: Positive for fatigue.   HENT: Negative.    Respiratory: Negative.    Cardiovascular: Negative.    Gastrointestinal: Negative.    Musculoskeletal: Negative.    Skin: Negative.    Neurological: Negative.    Psychiatric/Behavioral: Negative.         Depressed mood       Objective   Physical Exam   Constitutional: She is oriented to person, place, and time. She appears well-developed and well-nourished.   Neck: Normal range of motion. Neck supple.   Cardiovascular: Normal rate, regular rhythm and normal heart sounds.    Pulmonary/Chest: Effort normal and breath sounds normal.   Neurological: She is alert and oriented to person, place, and  "time.   Skin: Skin is warm and dry.   Psychiatric: She has a normal mood and affect. Her behavior is normal. Judgment and thought content normal.   Nursing note and vitals reviewed.      Procedures    Vitals: Blood pressure 103/73, pulse 93, height 165.1 cm (65\"), weight 59.9 kg (132 lb), SpO2 98 %, not currently breastfeeding.    Allergies:   Allergies   Allergen Reactions   • Cefdinir Shortness Of Breath   • Codeine    • Lyrica [Pregabalin] Mental Status Change     pts stated lyrica gave her horrible dreams    • Morphine And Related    • Sulfa Antibiotics         During this visit the following were done:  Labs Reviewed []    Labs Ordered []    Radiology Reports Reviewed []    Radiology Ordered []    PCP Records Reviewed []    Referring Provider Records Reviewed []    ER Records Reviewed []    Hospital Records Reviewed []    History Obtained From Family []    Radiology Images Reviewed []    Other Reviewed []    Records Requested []      Assessment/Plan   Hamida was seen today for knee pain.    Diagnoses and all orders for this visit:    Chronic pain of right knee  -     Ambulatory Referral to Physical Therapy Evaluate and treat    Bipolar affective disorder, currently depressed, mild               "

## 2018-08-01 ENCOUNTER — OFFICE VISIT (OUTPATIENT)
Dept: CARDIOLOGY | Facility: CLINIC | Age: 68
End: 2018-08-01

## 2018-08-01 ENCOUNTER — OFFICE VISIT (OUTPATIENT)
Dept: FAMILY MEDICINE CLINIC | Facility: CLINIC | Age: 68
End: 2018-08-01

## 2018-08-01 VITALS
HEIGHT: 65 IN | OXYGEN SATURATION: 98 % | HEART RATE: 167 BPM | SYSTOLIC BLOOD PRESSURE: 124 MMHG | WEIGHT: 131 LBS | BODY MASS INDEX: 21.83 KG/M2 | DIASTOLIC BLOOD PRESSURE: 86 MMHG

## 2018-08-01 VITALS
WEIGHT: 131 LBS | DIASTOLIC BLOOD PRESSURE: 93 MMHG | HEIGHT: 66 IN | HEART RATE: 110 BPM | BODY MASS INDEX: 21.05 KG/M2 | OXYGEN SATURATION: 97 % | SYSTOLIC BLOOD PRESSURE: 139 MMHG

## 2018-08-01 DIAGNOSIS — I48.91 ATRIAL FIBRILLATION, NEW ONSET (HCC): ICD-10-CM

## 2018-08-01 DIAGNOSIS — F31.31 BIPOLAR AFFECTIVE DISORDER, CURRENTLY DEPRESSED, MILD (HCC): Primary | ICD-10-CM

## 2018-08-01 DIAGNOSIS — E78.5 HYPERLIPIDEMIA, UNSPECIFIED HYPERLIPIDEMIA TYPE: ICD-10-CM

## 2018-08-01 DIAGNOSIS — R00.0 TACHYCARDIA: Primary | ICD-10-CM

## 2018-08-01 DIAGNOSIS — R07.9 CHEST PAIN, UNSPECIFIED TYPE: ICD-10-CM

## 2018-08-01 LAB
ALBUMIN SERPL-MCNC: 4.3 G/DL (ref 3.4–4.8)
ALBUMIN/GLOB SERPL: 1.7 G/DL (ref 1.5–2.5)
ALP SERPL-CCNC: 58 U/L (ref 35–104)
ALT SERPL W P-5'-P-CCNC: 23 U/L (ref 10–36)
ANION GAP SERPL CALCULATED.3IONS-SCNC: 7 MMOL/L (ref 3.6–11.2)
APTT PPP: 26.2 SECONDS (ref 23.8–36.1)
AST SERPL-CCNC: 32 U/L (ref 10–30)
BASOPHILS # BLD AUTO: 0.04 10*3/MM3 (ref 0–0.3)
BASOPHILS NFR BLD AUTO: 0.5 % (ref 0–2)
BILIRUB SERPL-MCNC: 0.4 MG/DL (ref 0.2–1.8)
BUN BLD-MCNC: 12 MG/DL (ref 7–21)
BUN/CREAT SERPL: 12.8 (ref 7–25)
CALCIUM SPEC-SCNC: 10 MG/DL (ref 7.7–10)
CHLORIDE SERPL-SCNC: 104 MMOL/L (ref 99–112)
CO2 SERPL-SCNC: 30 MMOL/L (ref 24.3–31.9)
CREAT BLD-MCNC: 0.94 MG/DL (ref 0.43–1.29)
DEPRECATED RDW RBC AUTO: 43.4 FL (ref 37–54)
EOSINOPHIL # BLD AUTO: 0.21 10*3/MM3 (ref 0–0.7)
EOSINOPHIL NFR BLD AUTO: 2.5 % (ref 0–7)
ERYTHROCYTE [DISTWIDTH] IN BLOOD BY AUTOMATED COUNT: 12.6 % (ref 11.5–14.5)
GFR SERPL CREATININE-BSD FRML MDRD: 60 ML/MIN/1.73
GLOBULIN UR ELPH-MCNC: 2.6 GM/DL
GLUCOSE BLD-MCNC: 76 MG/DL (ref 70–110)
HCT VFR BLD AUTO: 42.2 % (ref 37–47)
HGB BLD-MCNC: 13.9 G/DL (ref 12–16)
IMM GRANULOCYTES # BLD: 0.02 10*3/MM3 (ref 0–0.03)
IMM GRANULOCYTES NFR BLD: 0.2 % (ref 0–0.5)
INR PPP: 0.94 (ref 0.9–1.1)
LYMPHOCYTES # BLD AUTO: 2.35 10*3/MM3 (ref 1–3)
LYMPHOCYTES NFR BLD AUTO: 27.9 % (ref 16–46)
MAGNESIUM SERPL-MCNC: 2 MG/DL (ref 1.7–2.6)
MCH RBC QN AUTO: 32.3 PG (ref 27–33)
MCHC RBC AUTO-ENTMCNC: 32.9 G/DL (ref 33–37)
MCV RBC AUTO: 97.9 FL (ref 80–94)
MONOCYTES # BLD AUTO: 1.22 10*3/MM3 (ref 0.1–0.9)
MONOCYTES NFR BLD AUTO: 14.5 % (ref 0–12)
NEUTROPHILS # BLD AUTO: 4.59 10*3/MM3 (ref 1.4–6.5)
NEUTROPHILS NFR BLD AUTO: 54.4 % (ref 40–75)
OSMOLALITY SERPL CALC.SUM OF ELEC: 279.8 MOSM/KG (ref 273–305)
PLATELET # BLD AUTO: 186 10*3/MM3 (ref 130–400)
PMV BLD AUTO: 10.3 FL (ref 6–10)
POTASSIUM BLD-SCNC: 4.1 MMOL/L (ref 3.5–5.3)
PROT SERPL-MCNC: 6.9 G/DL (ref 6–8)
PROTHROMBIN TIME: 12.8 SECONDS (ref 11–15.4)
RBC # BLD AUTO: 4.31 10*6/MM3 (ref 4.2–5.4)
SODIUM BLD-SCNC: 141 MMOL/L (ref 135–153)
T4 FREE SERPL-MCNC: 1.22 NG/DL (ref 0.89–1.76)
TSH SERPL DL<=0.05 MIU/L-ACNC: 0.91 MIU/ML (ref 0.55–4.78)
WBC NRBC COR # BLD: 8.43 10*3/MM3 (ref 4.5–12.5)

## 2018-08-01 PROCEDURE — 84443 ASSAY THYROID STIM HORMONE: CPT | Performed by: NURSE PRACTITIONER

## 2018-08-01 PROCEDURE — 80053 COMPREHEN METABOLIC PANEL: CPT | Performed by: NURSE PRACTITIONER

## 2018-08-01 PROCEDURE — 99204 OFFICE O/P NEW MOD 45 MIN: CPT | Performed by: INTERNAL MEDICINE

## 2018-08-01 PROCEDURE — 85730 THROMBOPLASTIN TIME PARTIAL: CPT | Performed by: NURSE PRACTITIONER

## 2018-08-01 PROCEDURE — 99214 OFFICE O/P EST MOD 30 MIN: CPT | Performed by: NURSE PRACTITIONER

## 2018-08-01 PROCEDURE — 93000 ELECTROCARDIOGRAM COMPLETE: CPT | Performed by: INTERNAL MEDICINE

## 2018-08-01 PROCEDURE — 85610 PROTHROMBIN TIME: CPT | Performed by: NURSE PRACTITIONER

## 2018-08-01 PROCEDURE — 85025 COMPLETE CBC W/AUTO DIFF WBC: CPT | Performed by: NURSE PRACTITIONER

## 2018-08-01 PROCEDURE — 93005 ELECTROCARDIOGRAM TRACING: CPT | Performed by: NURSE PRACTITIONER

## 2018-08-01 PROCEDURE — 83735 ASSAY OF MAGNESIUM: CPT | Performed by: NURSE PRACTITIONER

## 2018-08-01 PROCEDURE — 84439 ASSAY OF FREE THYROXINE: CPT | Performed by: NURSE PRACTITIONER

## 2018-08-01 PROCEDURE — 36415 COLL VENOUS BLD VENIPUNCTURE: CPT | Performed by: NURSE PRACTITIONER

## 2018-08-01 RX ORDER — DILTIAZEM HYDROCHLORIDE 120 MG/1
120 CAPSULE, COATED, EXTENDED RELEASE ORAL DAILY
Qty: 30 CAPSULE | Refills: 11 | Status: ON HOLD | OUTPATIENT
Start: 2018-08-01 | End: 2020-10-06

## 2018-08-01 NOTE — PROGRESS NOTES
[facility] CARD[facility]  [address]  [place] [address]  Phone: [phone]  Fax: [phone]    08/01/2018    Chief Complaint: New Patient, tachycardia    History:   Hamida Cazares is a 67 y.o. female seen in consultation, referred by BEN Coronel*  For tachycardia . Pt for routine visit today at PCP and was referred for same day visit. No EKG tracing to compare to ours done in the office showing NSR. Pt was not feeling badly. No history of cardiac disease in pt or family. EKG most consistent with atrial flutter on EKG today. No hx CHF in pt. No HTN. NO DM. No stroke/TIA. No hx vascular disease. CHADS-vasc = 2 for age and female. No chest pain active or recent. Some point tenderness right chest. Smokes cigarettes. Pt with XOL. No hx thyroid disease. No hx PUD. No GIB symptoms.          Past Medical History:   Diagnosis Date   • Anxiety    • Arthritis    • Back pain    • Bipolar disorder (CMS/HCC)    • Depression    • Gastritis    • GERD (gastroesophageal reflux disease)    • Heel spur        Review of Systems:  Please see HPI  Constitution: No chills, no rigors, no unexplained weight loss or weight gain  Eyes:  No diplopia, no blurred vision, no loss of vision, conjunctiva is pink and sclera is anicteric  ENT:  No tinnitus, no otorrhea, no epistaxis, no sore throat   Respiratory: No cough, no hemoptysis  Cardiovascular: see HPI  Gastrointestinal: No nausea, no vomiting, no hematemesis, no diarrhea or constipation, no melena  Genitourinary: No frequency of dysuria no hematuria  Integument: No pruritis and  no skin rash  Hematologic / Lymphatic: No excessive bleeding, easy bruising, fatigue, lymphadenopathy and petechiae  Musculoskeletal: No joint pain, joint stiffness, joint swelling, muscle pain, muscle weakness and neck pain  Neurological: No dizziness, headaches, light headedness, seizures and vertigo  Endocrine: No frequent urination and nocturia, temperature  intolerance, weight gain, unintended and weight loss, unintended        Past Social History:  Social History     Social History   • Marital status: Single     Social History Main Topics   • Smoking status: Heavy Tobacco Smoker     Packs/day: 0.50     Types: Cigarettes   • Smokeless tobacco: Never Used   • Alcohol use No   • Drug use: No   • Sexual activity: Defer     Other Topics Concern   • Not on file       Past Family History:  Family History   Problem Relation Age of Onset   • Heart failure Mother    • Lung cancer Father        Current Outpatient Prescriptions on File Prior to Visit   Medication Sig Dispense Refill   • atorvastatin (LIPITOR) 20 MG tablet Take 1 tablet by mouth Every Night. 30 tablet 5   • divalproex (DEPAKOTE) 500 MG DR tablet Take 1 tablet by mouth 2 (Two) Times a Day. 60 tablet 5   • Glucosamine-Chondroitin--200-150 MG tablet Take  by mouth.     • LYRICA 75 MG capsule TK 1 C PO BID  3   • Multiple Vitamins-Minerals (HAIR SKIN AND NAILS FORMULA PO) Take  by mouth.     • NON FORMULARY Taking Wheat grass vitamin     • omeprazole (priLOSEC) 20 MG capsule Take 1 capsule by mouth Daily. 30 capsule 5   • ondansetron (ZOFRAN) 8 MG tablet Take 1 tablet by mouth Every 8 (Eight) Hours As Needed for Nausea or Vomiting. 10 tablet 0   • QUEtiapine (SEROquel) 100 MG tablet Take 1 tablet by mouth Every Night. 30 tablet 5   • QUEtiapine (SEROquel) 50 MG tablet Take 1 tablet by mouth 2 (Two) Times a Day. 60 tablet 5   • vitamin C (ASCORBIC ACID) 500 MG tablet Take 500 mg by mouth Daily.       No current facility-administered medications on file prior to visit.        Allergies   Allergen Reactions   • Cefdinir Shortness Of Breath   • Codeine    • Lyrica [Pregabalin] Mental Status Change     pts stated lyrica gave her horrible dreams    • Morphine And Related    • Sulfa Antibiotics        Objective:  Vitals:    08/01/18 1518   BP: 139/93   Pulse: 110   SpO2: 97%         Comfortable NAD  PERRL, conjunctiva  clear  Neck supple, no JVD or thyromegaly appreciated  S1/S2 RRR, no m/r/g  Lungs CTA B, normal effort  Abdomen S/NT/ND (+) BS, no HSM appreciated  Extremities warm, no clubbing, cyanosis, or edema  Normal gait  No visible or palpable skin lesions  A/Ox4, mood and affect appropriate  Pulse exam: Seymour's:    DATA:                               ECG 12 Lead  Date/Time: 8/1/2018 3:18 PM  Performed by: AINSLEY OLIVEIRA  Authorized by: AINSLEY OLIVEIRA               I personally viewed and interpreted the patient's EKG/Telemetry data. NSR    A/P:    PAflutter: asymtpomatic. CHADSVASC =2. Will check echo and stress test. TSH nml. Add cardizem CD 120mg daily.    STOP SMOKING.     F/u 2-3 weeks.    Thank you for allowing me to participate in the care of Hamida Cazares. Feel free to contact me directly with any further questions or concerns.

## 2018-08-01 NOTE — PROGRESS NOTES
"Elizabeth Cazares is a 67 y.o. female.     Chief Complaint: Medication Problem (pt wishes to discuss meds )    Atrial Fibrillation   Presents for initial visit. Symptoms are negative for bradycardia, chest pain, dizziness, hypertension, hypotension, palpitations, shortness of breath, syncope and weakness. The symptoms have been stable. Past treatments include nothing. Past medical history includes atrial fibrillation.       Pt came into the office today to discuss her seroquel.  She has taken the seroquel for over 3 years now.  She states that she was told by another individual that the seroquel \"would kill her.\"  She states that the medication has her symptoms of anxiety and depression well controlled and she has not had any issues with it.  After discussion, she is willing to continue taking the seroquel as before.     When patient was in triage her HR was noted to be 160s.  She has denied any complaints of chest pain, SOA, dizziness, headache, weakness, or syncope.  We obtained an EKG while in the office due to HR and it appears that she is is having atrial fib/flutter.  Pt sat is 98% on room air.  She has stated that she does not wish to go to the ER for evaluation.  Staff has called cardiology and she has an appointment scheduled with Dr. Calderon today at 3:40.  I have discussed with her the importance of her keeping this appt.  She states that she will keep the appt as scheduled.     Family History   Problem Relation Age of Onset   • Heart failure Mother    • Lung cancer Father        Social History     Social History   • Marital status: Single     Spouse name: N/A   • Number of children: N/A   • Years of education: N/A     Occupational History   • Not on file.     Social History Main Topics   • Smoking status: Heavy Tobacco Smoker     Packs/day: 0.50     Types: Cigarettes   • Smokeless tobacco: Never Used   • Alcohol use No   • Drug use: No   • Sexual activity: Defer     Other Topics Concern   • Not " "on file     Social History Narrative   • No narrative on file       Past Medical History:   Diagnosis Date   • Anxiety    • Arthritis    • Back pain    • Bipolar disorder (CMS/HCC)    • Depression    • Gastritis    • GERD (gastroesophageal reflux disease)    • Heel spur        Review of Systems   Constitutional: Negative for fatigue and unexpected weight change.   Eyes: Negative for visual disturbance.   Respiratory: Negative for shortness of breath.    Cardiovascular: Negative for chest pain, palpitations and syncope.   Gastrointestinal: Negative for abdominal pain, constipation, diarrhea and nausea.   Genitourinary: Negative for difficulty urinating, dysuria and frequency.   Skin: Negative.    Neurological: Negative for dizziness, weakness and numbness.       Objective   Physical Exam   Constitutional: She is oriented to person, place, and time. She appears well-developed and well-nourished.   Neck: Normal range of motion. Neck supple.   Cardiovascular: S1 normal and S2 normal.  An irregular rhythm present.   Pulmonary/Chest: Effort normal and breath sounds normal.   Neurological: She is alert and oriented to person, place, and time.   Skin: Skin is warm and dry.   Psychiatric: She has a normal mood and affect. Her behavior is normal. Judgment and thought content normal.   Nursing note and vitals reviewed.        ECG 12 Lead  Date/Time: 8/1/2018 11:06 AM  Performed by: MARIELLE UMAÑA  Authorized by: MARIELLE UMAÑA   Comparison: not compared with previous ECG   Rhythm: atrial flutter and atrial fibrillation  Rate: tachycardic  Clinical impression: abnormal ECG  Comments: Atrial fib/flutter            Vitals: Blood pressure 124/86, pulse (!) 167, height 165.1 cm (65\"), weight 59.4 kg (131 lb), SpO2 98 %, not currently breastfeeding.    Allergies:   Allergies   Allergen Reactions   • Cefdinir Shortness Of Breath   • Codeine    • Lyrica [Pregabalin] Mental Status Change     pts stated lyrica gave her " horrible dreams    • Morphine And Related    • Sulfa Antibiotics         During this visit the following were done:  Labs Reviewed []    Labs Ordered []    Radiology Reports Reviewed []    Radiology Ordered []    PCP Records Reviewed []    Referring Provider Records Reviewed []    ER Records Reviewed []    Hospital Records Reviewed []    History Obtained From Family []    Radiology Images Reviewed []    Other Reviewed []    Records Requested []      Assessment/Plan   Hamida was seen today for medication problem.    Diagnoses and all orders for this visit:    Bipolar affective disorder, currently depressed, mild (CMS/HCC)  -     CBC & Differential  -     Comprehensive Metabolic Panel  -     Magnesium  -     TSH  -     T4, Free  -     CBC Auto Differential    Hyperlipidemia, unspecified hyperlipidemia type  -     CBC & Differential  -     Comprehensive Metabolic Panel  -     Magnesium  -     TSH  -     T4, Free  -     CBC Auto Differential    Atrial fibrillation, new onset (CMS/HCC)  -     ECG 12 Lead  -     CBC & Differential  -     Comprehensive Metabolic Panel  -     Magnesium  -     TSH  -     T4, Free  -     CBC Auto Differential  -     Protime-INR  -     Ambulatory Referral to Cardiology  -     APTT

## 2018-08-02 ENCOUNTER — TELEPHONE (OUTPATIENT)
Dept: FAMILY MEDICINE CLINIC | Facility: CLINIC | Age: 68
End: 2018-08-02

## 2018-08-02 NOTE — TELEPHONE ENCOUNTER
If she has questions regarding the medication she was prescribed by the cardiologist, she will need to speak to him.

## 2018-08-02 NOTE — TELEPHONE ENCOUNTER
Pt called and stated she wants to speak directly to Lillian, she refuses to speak with a nurse. Pt stated that she has concerns about new medication and strongly wishes to discuss this with Lillian, personally.

## 2018-08-08 NOTE — TELEPHONE ENCOUNTER
Pt discussed with Sparkle and pt made aware that Lillian does not make phone calls and that she needs to discuss this with Cardio. Pt verbalized understanding.

## 2018-08-09 DIAGNOSIS — F41.9 ANXIETY AND DEPRESSION: ICD-10-CM

## 2018-08-09 DIAGNOSIS — F31.9 BIPOLAR AFFECTIVE DISORDER, REMISSION STATUS UNSPECIFIED (HCC): ICD-10-CM

## 2018-08-09 DIAGNOSIS — F32.A ANXIETY AND DEPRESSION: ICD-10-CM

## 2018-08-09 RX ORDER — QUETIAPINE FUMARATE 100 MG/1
TABLET, FILM COATED ORAL
Qty: 30 TABLET | Refills: 4 | Status: SHIPPED | OUTPATIENT
Start: 2018-08-09 | End: 2018-08-22 | Stop reason: SDUPTHER

## 2018-08-10 DIAGNOSIS — K21.9 GASTROESOPHAGEAL REFLUX DISEASE, ESOPHAGITIS PRESENCE NOT SPECIFIED: ICD-10-CM

## 2018-08-10 DIAGNOSIS — F32.A ANXIETY AND DEPRESSION: ICD-10-CM

## 2018-08-10 DIAGNOSIS — F31.9 BIPOLAR AFFECTIVE DISORDER, REMISSION STATUS UNSPECIFIED (HCC): ICD-10-CM

## 2018-08-10 DIAGNOSIS — F41.9 ANXIETY AND DEPRESSION: ICD-10-CM

## 2018-08-10 RX ORDER — DIVALPROEX SODIUM 500 MG/1
500 TABLET, DELAYED RELEASE ORAL 2 TIMES DAILY
Qty: 60 TABLET | Refills: 1 | Status: SHIPPED | OUTPATIENT
Start: 2018-08-10 | End: 2018-09-20 | Stop reason: SDUPTHER

## 2018-08-10 RX ORDER — OMEPRAZOLE 20 MG/1
20 CAPSULE, DELAYED RELEASE ORAL DAILY
Qty: 30 CAPSULE | Refills: 1 | Status: SHIPPED | OUTPATIENT
Start: 2018-08-10 | End: 2018-10-16 | Stop reason: SDUPTHER

## 2018-08-13 PROBLEM — I48.92 ATRIAL FLUTTER, PAROXYSMAL (HCC): Status: ACTIVE | Noted: 2018-08-13

## 2018-08-20 DIAGNOSIS — F41.9 ANXIETY AND DEPRESSION: ICD-10-CM

## 2018-08-20 DIAGNOSIS — F32.A ANXIETY AND DEPRESSION: ICD-10-CM

## 2018-08-20 DIAGNOSIS — F31.9 BIPOLAR AFFECTIVE DISORDER, REMISSION STATUS UNSPECIFIED (HCC): ICD-10-CM

## 2018-08-21 ENCOUNTER — TELEPHONE (OUTPATIENT)
Dept: CARDIOLOGY | Facility: CLINIC | Age: 68
End: 2018-08-21

## 2018-08-21 ENCOUNTER — TELEPHONE (OUTPATIENT)
Dept: FAMILY MEDICINE CLINIC | Facility: CLINIC | Age: 68
End: 2018-08-21

## 2018-08-21 DIAGNOSIS — F32.A ANXIETY AND DEPRESSION: ICD-10-CM

## 2018-08-21 DIAGNOSIS — F41.9 ANXIETY AND DEPRESSION: ICD-10-CM

## 2018-08-21 DIAGNOSIS — F31.9 BIPOLAR AFFECTIVE DISORDER, REMISSION STATUS UNSPECIFIED (HCC): ICD-10-CM

## 2018-08-21 RX ORDER — QUETIAPINE FUMARATE 50 MG/1
TABLET, FILM COATED ORAL
Qty: 60 TABLET | Refills: 4 | OUTPATIENT
Start: 2018-08-21

## 2018-08-21 RX ORDER — QUETIAPINE FUMARATE 50 MG/1
50 TABLET, FILM COATED ORAL NIGHTLY
Qty: 30 TABLET | Refills: 2 | Status: SHIPPED | OUTPATIENT
Start: 2018-08-21 | End: 2018-08-22 | Stop reason: SDUPTHER

## 2018-08-21 NOTE — TELEPHONE ENCOUNTER
Sparkle, I am really confused at this point about her seroquel dose.  Can you please call her pharmacy and verify the dosages that she has been receiving?

## 2018-08-21 NOTE — TELEPHONE ENCOUNTER
Sent in Prior Auth on Diltizem ER 120mg QD on 8/13/18. However, request was denied. I have talked to Dr. Calderon and he suggests we proceed with peer to peer or appeal. I had begun fill out paperwork for appeal, however, I got to the portion of the appeal sheet asking if patient had picked up medication and if so what was the amount paid. Called [facility], they confirmed she did pick it up the day it was called in and the amount paid was $1.25. Due to the fact that the amount was so little and the patient had not called us about her insurance not covering it; it is safe to say the insurance still will not agree to pay for medication due to such little cost.

## 2018-08-21 NOTE — TELEPHONE ENCOUNTER
I called the pharmacy she filled the 100mg on 8/10/18 of the seroquel so I refused & closed that encounter.

## 2018-08-22 ENCOUNTER — TELEPHONE (OUTPATIENT)
Dept: FAMILY MEDICINE CLINIC | Facility: CLINIC | Age: 68
End: 2018-08-22

## 2018-08-22 DIAGNOSIS — F32.A ANXIETY AND DEPRESSION: ICD-10-CM

## 2018-08-22 DIAGNOSIS — F41.9 ANXIETY AND DEPRESSION: ICD-10-CM

## 2018-08-22 DIAGNOSIS — F31.9 BIPOLAR AFFECTIVE DISORDER, REMISSION STATUS UNSPECIFIED (HCC): ICD-10-CM

## 2018-08-22 RX ORDER — QUETIAPINE FUMARATE 100 MG/1
100 TABLET, FILM COATED ORAL NIGHTLY
Qty: 30 TABLET | Refills: 3 | Status: SHIPPED | OUTPATIENT
Start: 2018-08-22 | End: 2020-10-16 | Stop reason: HOSPADM

## 2018-08-22 RX ORDER — QUETIAPINE FUMARATE 50 MG/1
50 TABLET, FILM COATED ORAL 2 TIMES DAILY
Qty: 60 TABLET | Refills: 3 | Status: ON HOLD | OUTPATIENT
Start: 2018-08-22 | End: 2020-10-06 | Stop reason: DRUGHIGH

## 2018-08-22 RX ORDER — QUETIAPINE FUMARATE 50 MG/1
TABLET, FILM COATED ORAL
Qty: 60 TABLET | Refills: 4 | OUTPATIENT
Start: 2018-08-22

## 2018-08-22 NOTE — TELEPHONE ENCOUNTER
Pt called and stated she takes Seroquel 50mg BID #60 and takes Seroquel 100mg qhs #30. Pt stated she as always had 2 separate scripts. Is this ok to fill medication this way?

## 2018-08-24 ENCOUNTER — OFFICE VISIT (OUTPATIENT)
Dept: FAMILY MEDICINE CLINIC | Facility: CLINIC | Age: 68
End: 2018-08-24

## 2018-08-24 VITALS
OXYGEN SATURATION: 99 % | HEART RATE: 101 BPM | WEIGHT: 130 LBS | DIASTOLIC BLOOD PRESSURE: 79 MMHG | SYSTOLIC BLOOD PRESSURE: 117 MMHG | BODY MASS INDEX: 20.89 KG/M2 | HEIGHT: 66 IN

## 2018-08-24 DIAGNOSIS — J01.00 ACUTE NON-RECURRENT MAXILLARY SINUSITIS: ICD-10-CM

## 2018-08-24 DIAGNOSIS — F31.31 BIPOLAR AFFECTIVE DISORDER, CURRENTLY DEPRESSED, MILD (HCC): Primary | ICD-10-CM

## 2018-08-24 DIAGNOSIS — I48.92 ATRIAL FLUTTER, PAROXYSMAL (HCC): ICD-10-CM

## 2018-08-24 PROCEDURE — 99213 OFFICE O/P EST LOW 20 MIN: CPT | Performed by: NURSE PRACTITIONER

## 2018-08-24 NOTE — PROGRESS NOTES
Elizabeth Cazares is a 67 y.o. female.     Chief Complaint: Follow-up and Manic Behavior    Sinusitis   This is a new problem. The current episode started 1 to 4 weeks ago. The problem is unchanged. There has been no fever. Associated symptoms include congestion. Pertinent negatives include no shortness of breath. Past treatments include antibiotics. The treatment provided no relief.   Anxiety   Presents for follow-up visit. Symptoms include depressed mood and nervous/anxious behavior. Patient reports no chest pain, shortness of breath or suicidal ideas. Symptoms occur most days. The severity of symptoms is moderate. The quality of sleep is fair. Nighttime awakenings: occasional.     Compliance with medications is %.   Atrial Fibrillation   Presents for follow-up (pt continues to follow with cardiology) visit. Symptoms are negative for chest pain, shortness of breath and weakness. The symptoms have been stable. Past medical history includes atrial fibrillation. There are no medication compliance problems.   Pt continues to follow with cardiology.  She is scheduled for a stress test.     Family History   Problem Relation Age of Onset   • Heart failure Mother    • Lung cancer Father        Social History     Social History   • Marital status: Single     Spouse name: N/A   • Number of children: N/A   • Years of education: N/A     Occupational History   • Not on file.     Social History Main Topics   • Smoking status: Heavy Tobacco Smoker     Packs/day: 0.50     Types: Cigarettes   • Smokeless tobacco: Never Used   • Alcohol use No   • Drug use: No   • Sexual activity: Defer     Other Topics Concern   • Not on file     Social History Narrative   • No narrative on file       Past Medical History:   Diagnosis Date   • Anxiety    • Arthritis    • Back pain    • Bipolar disorder (CMS/[facility])    • Depression    • Gastritis    • GERD (gastroesophageal reflux disease)    • Heel spur        Review of Systems  "  Constitutional: Negative.    HENT: Positive for congestion.    Respiratory: Negative.  Negative for shortness of breath.    Cardiovascular: Negative.  Negative for chest pain.   Gastrointestinal: Negative.    Musculoskeletal: Negative.    Skin: Negative.    Neurological: Negative.  Negative for weakness.   Psychiatric/Behavioral: Negative for suicidal ideas. The patient is nervous/anxious.        Objective   Physical Exam   Constitutional: She is oriented to person, place, and time. She appears well-developed and well-nourished.   HENT:   Right Ear: External ear normal.   Left Ear: External ear normal.   Mouth/Throat: Oropharynx is clear and moist.   Eyes: Pupils are equal, round, and reactive to light. Conjunctivae are normal.   Neck: Normal range of motion. Neck supple.   Cardiovascular: Normal rate, regular rhythm and normal heart sounds.    Pulmonary/Chest: Effort normal and breath sounds normal.   Neurological: She is alert and oriented to person, place, and time.   Skin: Skin is warm and dry.   Psychiatric: She has a normal mood and affect. Her behavior is normal. Judgment and thought content normal.   Nursing note and vitals reviewed.      Procedures    Vitals: Blood pressure 117/79, pulse 101, height 166.4 cm (65.5\"), weight 59 kg (130 lb), SpO2 99 %, not currently breastfeeding.    Allergies:   Allergies   Allergen Reactions   • Cefdinir Shortness Of Breath   • Codeine    • Lyrica [Pregabalin] Mental Status Change     pts stated lyrica gave her horrible dreams    • Morphine And Related    • Sulfa Antibiotics         During this visit the following were done:  Labs Reviewed []    Labs Ordered []    Radiology Reports Reviewed []    Radiology Ordered []    PCP Records Reviewed []    Referring Provider Records Reviewed []    ER Records Reviewed []    Hospital Records Reviewed []    History Obtained From Family []    Radiology Images Reviewed []    Other Reviewed []    Records Requested []      Assessment/Plan "   Hamida was seen today for follow-up and manic behavior.    Diagnoses and all orders for this visit:    Bipolar affective disorder, currently depressed, mild (CMS/HCC)    Atrial flutter, paroxysmal (CMS/HCC)    Acute non-recurrent maxillary sinusitis  -     loratadine-pseudoephedrine (CLARITIN-D 12 HOUR) 5-120 MG per 12 hr tablet; Take 1 tablet by mouth 2 (Two) Times a Day As Needed for Allergies.

## 2018-09-10 ENCOUNTER — TELEPHONE (OUTPATIENT)
Dept: CARDIOLOGY | Facility: CLINIC | Age: 68
End: 2018-09-10

## 2018-09-10 NOTE — TELEPHONE ENCOUNTER
Hamida called this morning concerned with the fact that she had not heard from anyone about the tests that Dr. Calderon had ordered for her. Valerie Vera in our office received this call. She got her name and told her that I would call her back.      After reviewing patient's chart, I see that she had missed her stress test and echo on 8/1/18. I have attempted to call patient back so that we can get her rescheduled. There was no answer and voicemail had not been set up. I will try again later.

## 2018-09-11 NOTE — TELEPHONE ENCOUNTER
Patient called me back. I explained that we see that she missed her appointments but we wouldn't care a bit for her to be rescheduled. Patient is fine with this.      While I was on the phone with her we received fax from [email or web address] for her to hold her Eliquis. I have asked her what she was having done. Patient states that she is having her teeth pulled for dentures. I told her that I would check with Dr. Calderon on Monday and let the other doctor know. She is wanting me to check with a doctor who is here currently to see if they would be willing to sign off on this.I told her that I could ask but they may be more comfortable with the decision coming from Dr. Calderon but I would call her back once it was decided.

## 2018-09-12 ENCOUNTER — OFFICE VISIT (OUTPATIENT)
Dept: FAMILY MEDICINE CLINIC | Facility: CLINIC | Age: 68
End: 2018-09-12

## 2018-09-12 VITALS
SYSTOLIC BLOOD PRESSURE: 119 MMHG | HEART RATE: 96 BPM | WEIGHT: 130 LBS | DIASTOLIC BLOOD PRESSURE: 81 MMHG | BODY MASS INDEX: 20.89 KG/M2 | TEMPERATURE: 98.3 F | OXYGEN SATURATION: 98 % | HEIGHT: 66 IN

## 2018-09-12 DIAGNOSIS — R09.89 CHEST CONGESTION: Primary | ICD-10-CM

## 2018-09-12 PROCEDURE — 99213 OFFICE O/P EST LOW 20 MIN: CPT | Performed by: NURSE PRACTITIONER

## 2018-09-12 RX ORDER — PENICILLIN V POTASSIUM 500 MG/1
500 TABLET ORAL 2 TIMES DAILY
Qty: 14 TABLET | Refills: 0 | Status: SHIPPED | OUTPATIENT
Start: 2018-09-12 | End: 2019-07-09

## 2018-09-12 NOTE — PROGRESS NOTES
"Elizabeth Cazares is a 67 y.o. female.     Chief Complaint: Nasal Congestion    URI    This is a chronic problem. The current episode started more than 1 month ago. The problem has been unchanged. There has been no fever. Associated symptoms include congestion, coughing and sneezing. Pertinent negatives include no chest pain, ear pain, headaches, rhinorrhea, sinus pain, sore throat, swollen glands or wheezing. Treatments tried: amoxcillin, tetracycline, steroid injection,, claritin D. The treatment provided no relief.      Pt is adamant about getting PCN for her chest congestion.  I have discussed with her today that if her congestion is caused from virus (which is probably the case since she has had antibiotics without any resolve of symptoms) that antibiotics will not help with her symptoms.  She persists in saying that she has always had PCN for her chest colds and she continually asks for PCN.  I have advised her that it is not standard of care for viral conditions.  I have discussed the possibility of C Diff due to antibiotics and the risk for her and she states that she still wants the PCN.  Pt has called the office several times over the past few weeks demanding PCN injection.  She has been told that we do not have PCN injections.  She has been very rude and beligerent with staff also.    I have relunctantly given her a prescription for PCN today as she has requested.  However, I have told her that I will no longer write her any further prescriptions for PCN or any other antibiotic for a \"chest cold.\"      Family History   Problem Relation Age of Onset   • Heart failure Mother    • Lung cancer Father        Social History     Social History   • Marital status: Single     Spouse name: N/A   • Number of children: N/A   • Years of education: N/A     Occupational History   • Not on file.     Social History Main Topics   • Smoking status: Heavy Tobacco Smoker     Packs/day: 0.50     Types: Cigarettes   • " "Smokeless tobacco: Never Used   • Alcohol use No   • Drug use: No   • Sexual activity: Defer     Other Topics Concern   • Not on file     Social History Narrative   • No narrative on file       Past Medical History:   Diagnosis Date   • Anxiety    • Arthritis    • Back pain    • Bipolar disorder (CMS/HCC)    • Depression    • Gastritis    • GERD (gastroesophageal reflux disease)    • Heel spur        Review of Systems   Constitutional: Negative.    HENT: Positive for congestion and sneezing. Negative for ear pain, rhinorrhea, sinus pain and sore throat.    Respiratory: Positive for cough. Negative for wheezing.    Cardiovascular: Negative.  Negative for chest pain.   Gastrointestinal: Negative.    Musculoskeletal: Negative.    Skin: Negative.    Neurological: Negative.  Negative for headaches.   Psychiatric/Behavioral: Negative.        Objective   Physical Exam   Constitutional: She is oriented to person, place, and time. She appears well-developed and well-nourished.   HENT:   Right Ear: External ear normal.   Left Ear: External ear normal.   Nose: Nose normal.   Mouth/Throat: Oropharynx is clear and moist.   Eyes: Pupils are equal, round, and reactive to light. Conjunctivae are normal.   Neck: Normal range of motion. Neck supple.   Cardiovascular: Normal rate, regular rhythm and normal heart sounds.    Pulmonary/Chest: Effort normal and breath sounds normal. She has no wheezes.   Neurological: She is alert and oriented to person, place, and time.   Skin: Skin is warm and dry.   Psychiatric: She has a normal mood and affect. Her behavior is normal. Judgment and thought content normal.   Nursing note and vitals reviewed.      Procedures    Vitals: Blood pressure 119/81, pulse 96, temperature 98.3 °F (36.8 °C), temperature source Oral, height 166.4 cm (65.5\"), weight 59 kg (130 lb), SpO2 98 %, not currently breastfeeding.    Allergies:   Allergies   Allergen Reactions   • Cefdinir Shortness Of Breath   • Codeine    • " Lyrica [Pregabalin] Mental Status Change     pts stated lyrica gave her horrible dreams    • Morphine And Related    • Sulfa Antibiotics         During this visit the following were done:  Labs Reviewed []    Labs Ordered []    Radiology Reports Reviewed []    Radiology Ordered []    PCP Records Reviewed []    Referring Provider Records Reviewed []    ER Records Reviewed []    Hospital Records Reviewed []    History Obtained From Family []    Radiology Images Reviewed []    Other Reviewed []    Records Requested []      Assessment/Plan   Hamida was seen today for nasal congestion.    Diagnoses and all orders for this visit:    Chest congestion  -     penicillin v potassium (VEETID) 500 MG tablet; Take 1 tablet by mouth 2 (Two) Times a Day.

## 2018-09-20 DIAGNOSIS — F32.A ANXIETY AND DEPRESSION: ICD-10-CM

## 2018-09-20 DIAGNOSIS — F41.9 ANXIETY AND DEPRESSION: ICD-10-CM

## 2018-09-20 DIAGNOSIS — F31.9 BIPOLAR AFFECTIVE DISORDER, REMISSION STATUS UNSPECIFIED (HCC): ICD-10-CM

## 2018-09-20 RX ORDER — DIVALPROEX SODIUM 500 MG/1
500 TABLET, DELAYED RELEASE ORAL 2 TIMES DAILY
Qty: 60 TABLET | Refills: 2 | Status: ON HOLD | OUTPATIENT
Start: 2018-09-20 | End: 2020-10-16 | Stop reason: SDUPTHER

## 2018-09-23 ENCOUNTER — NURSE TRIAGE (OUTPATIENT)
Dept: CALL CENTER | Facility: HOSPITAL | Age: 68
End: 2018-09-23

## 2018-09-23 NOTE — TELEPHONE ENCOUNTER
Natasha has had diarrhea stools today, loose stool, not watery.  She says she has been on antibiotics for about a month, different antibiotics over the last month.  Her MD warned her of diarrhea and she told her she may end up in the emergency room.  She completed the antibiotics about 3 days ago.  Advice given per guideline.        Reason for Disposition  • [1] Recent antibiotic therapy (i.e., within last 2 months) AND [2] > 3 days since antibiotic was stopped    Additional Information  • Negative: Shock suspected (e.g., cold/pale/clammy skin, too weak to stand, low BP, rapid pulse)  • Negative: Difficult to awaken or acting confused (e.g., disoriented, slurred speech)  • Negative: Sounds like a life-threatening emergency to the triager  • Negative: Vomiting also present and worse than the diarrhea  • Negative: [1] Blood in stool AND [2] without diarrhea  • Negative: Diarrhea in a cancer patient who is currently (or recently) receiving chemotherapy or radiation therapy, or cancer patient who has metastatic or end-stage cancer and is receiving palliative care  • Negative: [1] SEVERE abdominal pain (e.g., excruciating) AND [2] present > 1 hour  • Negative: [1] SEVERE abdominal pain AND [2] age > 60  • Negative: [1] Blood in the stool AND [2] moderate or large amount of blood  • Negative: Black or tarry bowel movements  (Exception: chronic-unchanged  black-grey bowel movements AND is taking iron pills or Pepto-bismol)  • Negative: [1] Drinking very little AND [2] dehydration suspected (e.g., no urine > 12 hours, very dry mouth, very lightheaded)  • Negative: Patient sounds very sick or weak to the triager  • Negative: [1] SEVERE diarrhea (e.g., 7 or more times / day more than normal) AND [2]  age > 60 years  • Negative: [1] Constant abdominal pain AND [2] present > 2 hours  • Negative: [1] Fever > 103 F (39.4 C) AND [2] not able to get the fever down using Fever Care Advice  • Negative: [1] SEVERE diarrhea (e.g., 7 or  "more times / day more than normal) AND [2] present > 24 hours (1 day)  • Negative: [1] MODERATE diarrhea (e.g., 4-6 times / day more than normal) AND [2] present > 48 hours (2 days)  • Negative: [1] MODERATE diarrhea (e.g., 4-6 times / day more than normal) AND [2] age > 70 years  • Negative: Fever > 101 F (38.3 C)  • Negative: Fever present > 3 days (72 hours)  • Negative: Abdominal pain  (Exception: Pain clears with each passage of diarrhea stool)  • Negative: [1] Blood in the stool AND [2] small amount of blood   (Exception: only on toilet paper. Reason: diarrhea can cause rectal irritation with blood on wiping)  • Negative: [1] Mucus or pus in stool AND [2] present > 2 days AND [3] diarrhea is more than mild    Answer Assessment - Initial Assessment Questions  1. DIARRHEA SEVERITY: \"How bad is the diarrhea?\" \"How many extra stools have you had in the past 24 hours than normal?\"  3    - MILD: Few loose or mushy BMs; increase of 1-3 stools over normal daily number of stools; mild increase in ostomy output.    - MODERATE: Increase of 4-6 stools daily over normal; moderate increase in ostomy output.    - SEVERE (or Worst Possible): Increase of 7 or more stools daily over normal; moderate increase in ostomy output; incontinence.      Mild  2. ONSET: \"When did the diarrhea begin?\"       today  3. BM CONSISTENCY: \"How loose or watery is the diarrhea?\"       loose  4. VOMITING: \"Are you also vomiting?\" If so, ask: \"How many times in the past 24 hours?\"       No vomiting  5. ABDOMINAL PAIN: \"Are you having any abdominal pain?\" If yes: \"What does it feel like?\" (e.g., crampy, dull, intermittent, constant)       cramping with eating a meal  6. ABDOMINAL PAIN SEVERITY: If present, ask: \"How bad is the pain?\"  (e.g., Scale 1-10; mild, moderate, or severe)     - MILD (1-3): doesn't interfere with normal activities, abdomen soft and not tender to touch      - MODERATE (4-7): interferes with normal activities or awakens from " "sleep, tender to touch      - SEVERE (8-10): excruciating pain, doubled over, unable to do any normal activities    mild        7. ORAL INTAKE: If vomiting, \"Have you been able to drink liquids?\" \"How much fluids have you had in the past 24 hours?\"      No vomiting, drinking plenty of fluids, mainly water  8. HYDRATION: \"Any signs of dehydration?\" (e.g., dry mouth [not just dry lips], too weak to stand, dizziness, new weight loss) \"When did you last urinate?\"      No s/s dehydration  9. EXPOSURE: \"Have you traveled to a foreign country recently?\" \"Have you been exposed to anyone with diarrhea?\" \"Could you have eaten any food that was spoiled?\"      no  10. OTHER SYMPTOMS: \"Do you have any other symptoms?\" (e.g., fever, blood in stool)        no  11. PREGNANCY: \"Is there any chance you are pregnant?\" \"When was your last menstrual period?\"        na    Protocols used: DIARRHEA-ADULT-AH      "

## 2018-09-27 ENCOUNTER — TELEPHONE (OUTPATIENT)
Dept: FAMILY MEDICINE CLINIC | Facility: CLINIC | Age: 68
End: 2018-09-27

## 2018-09-27 NOTE — TELEPHONE ENCOUNTER
"I called and explained that she is being discharged from care due to non compliance. Pt is not following care plan established by Lillian, therefor she diminished relationship between provider and patient. I explained to pt we would  be happy to write 30 day supply of medications, there for giving her enough time to find a PCP. I also offered referral to [facility] or [facility]. Pt stated \" oh I see, you and Briseida is ganging up on me\" PER PT.  I explained to pt that is not what is happening. I again repeated and explained in depth that pt is non compliant and has not taken medications as prescribed by Lillian. Pt then replied \" you and Briseida have yelled at me and treated me horrible, you should be ashamed of yourselves, Goodbye\" PER PT and then hung up the phone.   "

## 2018-09-27 NOTE — TELEPHONE ENCOUNTER
Patient called requesting 5 to 7 extra seroquel 100mg tabs because she reports she has had a very bad month & has had to take extra & has ran out ,she reports the 100mg are not due to be filled until 10/01/18,informed her she was to take as directed & not take extra ,she insisted I speak with her provider as I did & she also said she could not prescribe extra meds & that she was to take as directed,i informed her of this & she said she could not understand why we could not do this & hung up the phone.

## 2018-09-27 NOTE — TELEPHONE ENCOUNTER
After discussing phone call with Lillian, She then advised me to call and cancel additional refills. I called [facility] and spoke with Jimbo and advised to only fill one month (which will be October) he verbalized understanding.

## 2018-10-12 ENCOUNTER — TELEPHONE (OUTPATIENT)
Dept: CARDIOLOGY | Facility: CLINIC | Age: 68
End: 2018-10-12

## 2018-10-12 DIAGNOSIS — R07.9 CHEST PAIN, UNSPECIFIED TYPE: ICD-10-CM

## 2018-10-12 DIAGNOSIS — R00.0 TACHYCARDIA: Primary | ICD-10-CM

## 2018-10-12 NOTE — TELEPHONE ENCOUNTER
Patient is calling following up about her stress test that had originally been ordered on 8/1/18. She has called and refused nuclear stress test due to not wanting anything injected into her.      I have called and discussed with Dr. Calderon and he would prefer she have a Stress Echo with bubble study. There is no nuclear medicine involved or radiation.     Patient has hung up the phone as I was switching over to call. I have attempted to call patient back with number listed in patients chart.    I have called and left message with patient's brother to call us back with a better working number.         **Stress echocardiogram is test that determines how well your heart and blood vessels are working.  Medication that is given for bubble study is a very reversible medication used to speed up heart rate.

## 2018-10-15 NOTE — TELEPHONE ENCOUNTER
"Patient has called back to ask about her stress test.    I have explained to her that Dr. Calderon wishes for her to have a stress echocardiogram that would include a bubble study. She is wanting to know more details about what the \"bubble study\" includes because she is highly allergic to a lot of medications and she would like to know as much about it as possible so that she can avoid any allergic reactions. I told her that I could call down to the stress test and find this out. I would call her back if she would give me a better working number [phone].    I have called Ana Maria in the stress lab who states that as long as patient can tolerate treadmill to stress heart she will not have to have any medications to \"stress heart.\" Ana Maria states that the bubble study is saline but could possibly be with Definitity since Dr. Calderon is the ordering provider but non invasive would be able to tell me more about this. She has transferred me to [facility].    Talked to Jax and Maria Esther in non invasive. Maria Esther has explained that I should clarify with Dr. Calderon if he is wanting strictly a bubble study or use Definity. If he were want to use Definity, it is used as a contrast for better echocardiogram images. If he is wanting only a bubble study I can let the patient know that the bubbles are water + saline; the same solution used to flush an IV with or use for IV fluids. But again, I should make sure with Dr. Calderon. I told her that I would.   "

## 2018-10-16 DIAGNOSIS — K21.9 GASTROESOPHAGEAL REFLUX DISEASE, ESOPHAGITIS PRESENCE NOT SPECIFIED: ICD-10-CM

## 2018-10-16 RX ORDER — OMEPRAZOLE 20 MG/1
CAPSULE, DELAYED RELEASE ORAL
Qty: 30 CAPSULE | Refills: 0 | Status: ON HOLD | OUTPATIENT
Start: 2018-10-16 | End: 2020-10-06

## 2018-10-16 NOTE — TELEPHONE ENCOUNTER
"D/w Dr. Calderon about the use of Defniity with stress echo. He states that he would prefer that it be used during stress echo so that the imaging performed would be better for his use and purposes of the test. He states that the Definity is a form of contrast and to explain that they are \"micro bubbles\" I have called Hamida back to explain that they are microbubbles and that if at all possible Dr. Calderon would prefer that she allow us to use them during her test. She states that this should be okay.    I have put her on hold and called Jayshree in outpatient scheduling to make sure she was down there to receive the call transfer. She was and I have transferred Hamida down to extension [phone].  "

## 2018-10-22 ENCOUNTER — TELEPHONE (OUTPATIENT)
Dept: CARDIOLOGY | Facility: CLINIC | Age: 68
End: 2018-10-22

## 2018-10-22 NOTE — TELEPHONE ENCOUNTER
Hamida is calling today to let us know that she had originally been on Eliquis however her family doctor, Dr. Dunbar, at [facility] had switched her to Xarelto. Hamida had experienced a rash around her ankles as well as swelling and this had been thought to be a reaction from the Eliquis which is the reason behind switching the medication. However, Hamida states that she still has a rash and swelling. She states that the rash has gotten a little better but the swelling was still very much present in the ankles.    I asked if she had seen Dr. Dunbar since making this switch. She states that she went to their office today for a cold however did not mention any of the swelling or rash at that visit. I asked if she had been weighed at this visit and she states that she was. I asked if she had gained any way but she had not. She is wanting to know if the Xarelto could be the cause of this. I told her I was unsure but would check with a provider and give her a call back.

## 2018-10-24 RX ORDER — QUETIAPINE FUMARATE 50 MG/1
TABLET, FILM COATED ORAL
Qty: 60 TABLET | Refills: 0 | OUTPATIENT
Start: 2018-10-24

## 2018-11-20 ENCOUNTER — APPOINTMENT (OUTPATIENT)
Dept: CARDIOLOGY | Facility: HOSPITAL | Age: 68
End: 2018-11-20
Attending: INTERNAL MEDICINE

## 2018-11-27 ENCOUNTER — APPOINTMENT (OUTPATIENT)
Dept: CARDIOLOGY | Facility: HOSPITAL | Age: 68
End: 2018-11-27
Attending: INTERNAL MEDICINE

## 2019-01-16 DIAGNOSIS — R07.89 OTHER CHEST PAIN: Primary | ICD-10-CM

## 2019-02-04 ENCOUNTER — TRANSCRIBE ORDERS (OUTPATIENT)
Dept: ADMINISTRATIVE | Facility: HOSPITAL | Age: 69
End: 2019-02-04

## 2019-02-04 ENCOUNTER — HOSPITAL ENCOUNTER (OUTPATIENT)
Dept: GENERAL RADIOLOGY | Facility: HOSPITAL | Age: 69
Discharge: HOME | End: 2019-02-04
Admitting: NURSE PRACTITIONER

## 2019-02-04 DIAGNOSIS — R53.83 TIREDNESS: Primary | ICD-10-CM

## 2019-02-04 DIAGNOSIS — R53.83 TIREDNESS: ICD-10-CM

## 2019-02-04 PROCEDURE — 71046 X-RAY EXAM CHEST 2 VIEWS: CPT | Performed by: RADIOLOGY

## 2019-02-04 PROCEDURE — 71046 X-RAY EXAM CHEST 2 VIEWS: CPT

## 2019-02-13 ENCOUNTER — APPOINTMENT (OUTPATIENT)
Dept: NUCLEAR MEDICINE | Facility: HOSPITAL | Age: 69
End: 2019-02-13

## 2019-02-13 ENCOUNTER — APPOINTMENT (OUTPATIENT)
Dept: CARDIOLOGY | Facility: HOSPITAL | Age: 69
End: 2019-02-13

## 2019-05-01 ENCOUNTER — TRANSCRIBE ORDERS (OUTPATIENT)
Dept: ADMINISTRATIVE | Facility: HOSPITAL | Age: 69
End: 2019-05-01

## 2019-05-01 DIAGNOSIS — Z87.891 PERSONAL HISTORY OF TOBACCO USE, PRESENTING HAZARDS TO HEALTH: Primary | ICD-10-CM

## 2019-05-28 ENCOUNTER — NURSE TRIAGE (OUTPATIENT)
Dept: CALL CENTER | Facility: HOSPITAL | Age: 69
End: 2019-05-28

## 2019-05-29 NOTE — TELEPHONE ENCOUNTER
"Reviewed guideline with caller, advises caller be seen within 4 hours.States she will call a friend to drive her.     Reason for Disposition  • Looks like a broken bone (e.g., crooked or deformed)    Additional Information  • Negative: [1] Major bleeding (e.g., spurting blood) AND [2] can't be stopped  • Negative: Foot or ankle injury  • Negative: Looks infected  • Negative: Amputated toe  • Negative: Skin is split open or gaping  (or length > 1/2 inch or 12 mm)  • Negative: [1] Bleeding AND [2] won't stop after 10 minutes of direct pressure (using correct technique)  • Negative: [1] Dirt in the wound AND [2] not removed with 15 minutes of scrubbing  • Negative: Sounds like a serious injury to the triager  • Negative: [1] SEVERE pain AND [2] not improved 2 hours after pain medicine/ice packs  • Negative: [1] MODERATE-SEVERE pain AND [2] blood present under the toenail    Answer Assessment - Initial Assessment Questions  1. MECHANISM: \"How did the injury happen?\"       Hit her toe on a table leg, pulled the toe sideways  2. ONSET: \"When did the injury happen?\" (Minutes or hours ago)       2 hours ago  3. LOCATION: \"What part of the toe is injured?\" \"Is the nail damaged?\"       Toe itself and below  4. APPEARANCE of TOE INJURY: \"What does the injury look like?\"       Purple and blue and swollen  5. SEVERITY: \"Can you use the foot normally?\" \"Can you walk?\"       Can walk  6. SIZE: For cuts, bruises, or swelling, ask: \"How large is it?\" (e.g., inches or centimeters;  entire toe)       No bruising size of a half dollar  7. PAIN: \"Is there pain?\" If so, ask: \"How bad is the pain?\"   (e.g., Scale 1-10; or mild, moderate, severe)      Yes 8/10  8. TETANUS: For any breaks in the skin, ask: \"When was the last tetanus booster?\"      unknown  9. DIABETES: \"Do you have a history of diabetes or poor circulation in the feet?\"      Neuropathy tendonitis and plantars.   10. OTHER SYMPTOMS: \"Do you have any other symptoms?\"         " "no  11. PREGNANCY: \"Is there any chance you are pregnant?\" \"When was your last menstrual period?\"        na    Protocols used: TOE INJURY-ADULT-AH      "

## 2019-06-19 ENCOUNTER — NURSE TRIAGE (OUTPATIENT)
Dept: CALL CENTER | Facility: HOSPITAL | Age: 69
End: 2019-06-19

## 2019-06-19 NOTE — TELEPHONE ENCOUNTER
"She has afib and is a smoker, has neuropathy, and has rash on feet, she is supposed to have a nuclear stress test, is concerned about this, and asking what the difference is between a echo and a stress test and can an xray do the same thing, went over the tests with her.     Reason for Disposition  • Health Information question, no triage required and triager able to answer question    Additional Information  • Negative: [1] Caller is not with the adult (patient) AND [2] reporting urgent symptoms  • Negative: Lab result questions  • Negative: Medication questions  • Negative: Caller cannot be reached by phone  • Negative: Caller has already spoken to PCP or another triager  • Negative: RN needs further essential information from caller in order to complete triage  • Negative: Requesting regular office appointment  • Negative: [1] Caller requesting NON-URGENT health information AND [2] PCP's office is the best resource    Answer Assessment - Initial Assessment Questions  1. REASON FOR CALL or QUESTION: \"What is your reason for calling today?\" or \"How can I best help you?\" or \"What question do you have that I can help answer?\"      General information questions    Protocols used: INFORMATION ONLY CALL-ADULT-      "

## 2019-06-19 NOTE — TELEPHONE ENCOUNTER
"She hit her little toe on a table week or so ago, she states, and now her PCP is saying she needs to have another xray, was wanting to know why, Triage nurse told her there maybe something they need to evaluate with another view, was not sure why, call her  With questions.     Reason for Disposition  • General information question, no triage required and triager able to answer question    Additional Information  • Negative: [1] Caller is not with the adult (patient) AND [2] reporting urgent symptoms  • Negative: Lab result questions  • Negative: Medication questions  • Negative: Caller cannot be reached by phone  • Negative: Caller has already spoken to PCP or another triager  • Negative: RN needs further essential information from caller in order to complete triage  • Negative: Requesting regular office appointment  • Negative: [1] Caller requesting NON-URGENT health information AND [2] PCP's office is the best resource  • Negative: Health Information question, no triage required and triager able to answer question    Answer Assessment - Initial Assessment Questions  1. REASON FOR CALL or QUESTION: \"What is your reason for calling today?\" or \"How can I best help you?\" or \"What question do you have that I can help answer?\"      Has hurt her toe almost 2 weeks ago and has questions    Protocols used: INFORMATION ONLY CALL-ADULT-      "

## 2019-06-24 ENCOUNTER — HOSPITAL ENCOUNTER (OUTPATIENT)
Dept: GENERAL RADIOLOGY | Facility: HOSPITAL | Age: 69
Discharge: HOME | End: 2019-06-24
Admitting: NURSE PRACTITIONER

## 2019-06-24 ENCOUNTER — TRANSCRIBE ORDERS (OUTPATIENT)
Dept: ADMINISTRATIVE | Facility: HOSPITAL | Age: 69
End: 2019-06-24

## 2019-06-24 DIAGNOSIS — M79.672 LEFT FOOT PAIN: ICD-10-CM

## 2019-06-24 DIAGNOSIS — M79.672 LEFT FOOT PAIN: Primary | ICD-10-CM

## 2019-06-24 PROCEDURE — 73630 X-RAY EXAM OF FOOT: CPT

## 2019-06-24 PROCEDURE — 73630 X-RAY EXAM OF FOOT: CPT | Performed by: RADIOLOGY

## 2019-06-29 ENCOUNTER — NURSE TRIAGE (OUTPATIENT)
Dept: CALL CENTER | Facility: HOSPITAL | Age: 69
End: 2019-06-29
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
## 2019-07-09 ENCOUNTER — OFFICE VISIT (OUTPATIENT)
Dept: ORTHOPEDIC SURGERY | Facility: CLINIC | Age: 69
End: 2019-07-09

## 2019-07-09 VITALS
HEART RATE: 93 BPM | HEIGHT: 66 IN | DIASTOLIC BLOOD PRESSURE: 84 MMHG | WEIGHT: 130 LBS | SYSTOLIC BLOOD PRESSURE: 127 MMHG | BODY MASS INDEX: 20.89 KG/M2

## 2019-07-09 DIAGNOSIS — M79.675 PAIN OF LEFT GREAT TOE: ICD-10-CM

## 2019-07-09 DIAGNOSIS — M79.674 GREAT TOE PAIN, RIGHT: Primary | ICD-10-CM

## 2019-07-09 PROCEDURE — 99201 PR OFFICE OUTPATIENT NEW 10 MINUTES: CPT | Performed by: ORTHOPAEDIC SURGERY

## 2019-07-09 NOTE — PROGRESS NOTES
"Patient: Hamida Cazares    YOB: 1950    Chief Complaint   Patient presents with   • Right Foot - Pain, Edema   • Left Foot - Pain, Edema         History of Present Illness: Patient presents for evaluation of her toes.  States she has had a difficult pain of both her big toe on the right and her left toe on the left for about may be a year and a half to 2 years.  States she stubbed them with her shoes on.  Apparently they swell at night.      Past Medical History:   Diagnosis Date   • Anxiety    • Arthritis    • Back pain    • Bipolar disorder (CMS/HCC)    • Depression    • Gastritis    • GERD (gastroesophageal reflux disease)    • Heel spur         Social History     Socioeconomic History   • Marital status: Single     Spouse name: Not on file   • Number of children: Not on file   • Years of education: Not on file   • Highest education level: Not on file   Tobacco Use   • Smoking status: Current Every Day Smoker     Packs/day: 0.50     Types: Cigarettes   • Smokeless tobacco: Never Used   • Tobacco comment: Working on quitting   Substance and Sexual Activity   • Alcohol use: No   • Drug use: No   • Sexual activity: Defer           Physical Exam: 68 y.o. female  General Appearance:    Alert and oriented x 3, cooperative, in no acute distress                   Vitals:    07/09/19 1542   BP: 127/84   BP Location: Right arm   Patient Position: Sitting   Cuff Size: Adult   Pulse: 93   Weight: 59 kg (130 lb)   Height: 166.4 cm (65.5\")          Quick exam shows no obvious swelling deformity or skin color changes of either big toe or her feet.  Patient walks with a normal gait      Radiology:     X-rays of her left foot were reviewed myself and shows no obvious abnormalities or significant degenerative changes.        Assessment/Plan: History and exam were limited today as appeared to be some confusion as the patient  said her right great toe was worse than her left toe and that she had x-rays of it. I told her " on the computer the only thing I had was her left foot and her left great toe and there was no obvious abnormalities or fractures or arthritis on this.  She said her x-rays were done at [place] and I said I do not have access to those unless she specifically brings the disc with those x-rays on with her as many patients do.  I offered to x-ray her right foot but she said I was rude and she left the office.  I did make the recommendation of possibly seeing a podiatrist on her way out                  Patient's Body mass index is 21.3 kg/m². BMI is within normal parameters. No follow-up required..      Discussion/Summary:                This chart was completed utilizing the dragon speech recognition software.  Grammatical errors, random word insertions, pronoun errors, and incomplete sentences or occasional consequences of the system due to software limitations, ambient noise, and hardware issues.  Any questions or concerns about the content, text, or information contained within the body of this dictation should be directly addressed to the physician for clarification        This document was signed by Bennett Snow M.D. July 9, 2019 4:09 PM

## 2019-08-26 NOTE — TELEPHONE ENCOUNTER
That's fine.  Go ahead and send in to her pharmacy.  5 refills      Sent to pharmacy as requested & patient notified.   10:00

## 2019-08-28 ENCOUNTER — NURSE TRIAGE (OUTPATIENT)
Dept: CALL CENTER | Facility: HOSPITAL | Age: 69
End: 2019-08-28

## 2019-08-28 NOTE — TELEPHONE ENCOUNTER
"  Reason for Disposition  • [1] Angry or rude caller AND [2] doesn't respond to 5 minutes of triager counseling AND [3] sick adult (or caller)    Additional Information  • Negative: [1] Caller is not with the adult (patient) AND [2] reporting urgent symptoms  • Negative: Lab result questions  • Negative: Medication questions  • Negative: Caller cannot be reached by phone  • Negative: Caller has already spoken to PCP or another triager  • Negative: RN needs further essential information from caller in order to complete triage  • Negative: Requesting regular office appointment  • Negative: [1] Caller requesting NON-URGENT health information AND [2] PCP's office is the best resource  • Caller is angry or rude (e.g., hangs up, verbally abusive, yelling)  • Negative: Caller threatening to kill a specific person  • Negative: [1] Caller is very confused AND [2] no other adult (e.g., friend or family member) available  • Negative: Sounds like a life-threatening emergency to the triager  • Negative: Child abuse suspected  • Negative: Suicide concerns  • Negative: Patient sounds very sick or weak to the triager  • Negative: [1] [place] worried caller AND [2] second call within 4 hours about the same medical problem  • Negative: [1] [place] worried caller AND [2] third call within 48 hours about the same medical problem  • Negative: [1] [place] worried caller AND [2] can't be reassured by triager  • Negative: [1] Caller demands to speak with the PCP AND [2] about sick adult (or sick caller)    Answer Assessment - Initial Assessment Questions  1. REASON FOR CALL or QUESTION: \"What is your reason for calling today?\" or \"How can I best help you?\" or \"What question do you have that I can help answer?\"      See detailed note.    Answer Assessment - Initial Assessment Questions  1. SITUATION:  Document reason for call.      The person is calling about Atrial fibrillation   2. BACKGROUND: Document any background information (e.g., prior " calls, known psychiatric history)      She has called several times prior.   3. ASSESSMENT: Document your nursing assessment.      See detailed note.   4. RESPONSE: Document what your response or recommendation was.      She hung up on the caller, Explained that she would need to speak with the PCP .    Protocols used: DIFFICULT CALLER-ADULT-AH, INFORMATION ONLY CALL-ADULT-AH, NO CONTACT OR DUPLICATE CONTACT CALL-ADULT-AH

## 2019-08-28 NOTE — TELEPHONE ENCOUNTER
She was diagnosed with Atrial fibrillation. She is asking questions about this in the process she hung up on the nurse. This caller was very rude and short with the nurse on the phone. She was arguing if she really truly did have Afib. Explained that she would need to speak with her Pcp. She wanted to know if an ultra sound could be done. Explained that is not the correct test to get a clear picture of the heart. Asked if she had ever had a 2D echo. She stated I cancelled those test.

## 2019-09-20 ENCOUNTER — NURSE TRIAGE (OUTPATIENT)
Dept: CALL CENTER | Facility: HOSPITAL | Age: 69
End: 2019-09-20

## 2019-09-20 NOTE — TELEPHONE ENCOUNTER
"Caller asking for information on HIV testing.  Information obtained and reviewed with caller.    Reason for Disposition  • Health Information question, no triage required and triager able to answer question    Additional Information  • Negative: [1] Caller is not with the adult (patient) AND [2] reporting urgent symptoms  • Negative: Lab result questions  • Negative: Medication questions  • Negative: Caller cannot be reached by phone  • Negative: Caller has already spoken to PCP or another triager  • Negative: RN needs further essential information from caller in order to complete triage  • Negative: Requesting regular office appointment  • Negative: [1] Caller requesting NON-URGENT health information AND [2] PCP's office is the best resource    Answer Assessment - Initial Assessment Questions  1. REASON FOR CALL or QUESTION: \"What is your reason for calling today?\" or \"How can I best help you?\" or \"What question do you have that I can help answer?\"      How long do you need to wait to have a test for HIV?    Protocols used: INFORMATION ONLY CALL-ADULT-      "

## 2019-10-15 ENCOUNTER — NURSE TRIAGE (OUTPATIENT)
Dept: CALL CENTER | Facility: HOSPITAL | Age: 69
End: 2019-10-15

## 2019-10-15 NOTE — TELEPHONE ENCOUNTER
"She is wanting to know about a living will. Explained that she could go to a  or a eulalio for advice regarding this.     Reason for Disposition  • General information question, no triage required and triager able to answer question    Additional Information  • Negative: [1] Caller is not with the adult (patient) AND [2] reporting urgent symptoms  • Negative: Lab result questions  • Negative: Medication questions  • Negative: Caller cannot be reached by phone  • Negative: Caller has already spoken to PCP or another triager  • Negative: RN needs further essential information from caller in order to complete triage  • Negative: Requesting regular office appointment  • Negative: [1] Caller requesting NON-URGENT health information AND [2] PCP's office is the best resource  • Negative: Health Information question, no triage required and triager able to answer question    Answer Assessment - Initial Assessment Questions  1. REASON FOR CALL or QUESTION: \"What is your reason for calling today?\" or \"How can I best help you?\" or \"What question do you have that I can help answer?\"      See note    Protocols used: INFORMATION ONLY CALL-ADULT-      "

## 2019-10-21 ENCOUNTER — TRANSCRIBE ORDERS (OUTPATIENT)
Dept: ADMINISTRATIVE | Facility: HOSPITAL | Age: 69
End: 2019-10-21

## 2019-10-21 DIAGNOSIS — I73.9 CLAUDICATION OF BOTH LOWER EXTREMITIES (HCC): Primary | ICD-10-CM

## 2019-11-04 ENCOUNTER — NURSE TRIAGE (OUTPATIENT)
Dept: CALL CENTER | Facility: HOSPITAL | Age: 69
End: 2019-11-04
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
## 2019-11-11 ENCOUNTER — APPOINTMENT (OUTPATIENT)
Dept: CARDIOLOGY | Facility: HOSPITAL | Age: 69
End: 2019-11-11

## 2020-10-05 ENCOUNTER — APPOINTMENT (OUTPATIENT)
Dept: CT IMAGING | Facility: HOSPITAL | Age: 70
End: 2020-10-05

## 2020-10-05 ENCOUNTER — HOSPITAL ENCOUNTER (EMERGENCY)
Facility: HOSPITAL | Age: 70
Discharge: PSYCHIATRIC OR SUBSTANCE USE FACILITY | End: 2020-10-05
Attending: EMERGENCY MEDICINE | Admitting: FAMILY MEDICINE

## 2020-10-05 ENCOUNTER — HOSPITAL ENCOUNTER (INPATIENT)
Facility: HOSPITAL | Age: 70
LOS: 11 days | Discharge: HOME | End: 2020-10-16
Attending: PSYCHIATRY & NEUROLOGY | Admitting: PSYCHIATRY & NEUROLOGY

## 2020-10-05 ENCOUNTER — APPOINTMENT (OUTPATIENT)
Dept: GENERAL RADIOLOGY | Facility: HOSPITAL | Age: 70
End: 2020-10-05

## 2020-10-05 VITALS
SYSTOLIC BLOOD PRESSURE: 131 MMHG | BODY MASS INDEX: 21.66 KG/M2 | WEIGHT: 130 LBS | HEIGHT: 65 IN | TEMPERATURE: 98.3 F | HEART RATE: 100 BPM | RESPIRATION RATE: 18 BRPM | DIASTOLIC BLOOD PRESSURE: 90 MMHG | OXYGEN SATURATION: 99 %

## 2020-10-05 DIAGNOSIS — F23 ACUTE PSYCHOSIS (HCC): Primary | ICD-10-CM

## 2020-10-05 DIAGNOSIS — Z86.59 HISTORY OF SCHIZOPHRENIA: ICD-10-CM

## 2020-10-05 DIAGNOSIS — F31.9 BIPOLAR AFFECTIVE DISORDER, REMISSION STATUS UNSPECIFIED (HCC): ICD-10-CM

## 2020-10-05 DIAGNOSIS — F41.9 ANXIETY AND DEPRESSION: ICD-10-CM

## 2020-10-05 DIAGNOSIS — F32.A ANXIETY AND DEPRESSION: ICD-10-CM

## 2020-10-05 LAB
6-ACETYL MORPHINE: NEGATIVE
ALBUMIN SERPL-MCNC: 4.62 G/DL (ref 3.5–5.2)
ALBUMIN/GLOB SERPL: 1.9 G/DL
ALP SERPL-CCNC: 67 U/L (ref 39–117)
ALT SERPL W P-5'-P-CCNC: 18 U/L (ref 1–33)
AMPHET+METHAMPHET UR QL: NEGATIVE
ANION GAP SERPL CALCULATED.3IONS-SCNC: 13.8 MMOL/L (ref 5–15)
AST SERPL-CCNC: 36 U/L (ref 1–32)
BARBITURATES UR QL SCN: NEGATIVE
BASOPHILS # BLD AUTO: 0.04 10*3/MM3 (ref 0–0.2)
BASOPHILS NFR BLD AUTO: 0.5 % (ref 0–1.5)
BENZODIAZ UR QL SCN: NEGATIVE
BILIRUB SERPL-MCNC: 0.4 MG/DL (ref 0–1.2)
BILIRUB UR QL STRIP: ABNORMAL
BUN SERPL-MCNC: 20 MG/DL (ref 8–23)
BUN/CREAT SERPL: 21.3 (ref 7–25)
BUPRENORPHINE SERPL-MCNC: NEGATIVE NG/ML
CALCIUM SPEC-SCNC: 9.5 MG/DL (ref 8.6–10.5)
CANNABINOIDS SERPL QL: POSITIVE
CHLORIDE SERPL-SCNC: 102 MMOL/L (ref 98–107)
CLARITY UR: CLEAR
CO2 SERPL-SCNC: 23.2 MMOL/L (ref 22–29)
COCAINE UR QL: NEGATIVE
COLOR UR: ABNORMAL
CREAT SERPL-MCNC: 0.94 MG/DL (ref 0.57–1)
DEPRECATED RDW RBC AUTO: 46.1 FL (ref 37–54)
EOSINOPHIL # BLD AUTO: 0.03 10*3/MM3 (ref 0–0.4)
EOSINOPHIL NFR BLD AUTO: 0.3 % (ref 0.3–6.2)
ERYTHROCYTE [DISTWIDTH] IN BLOOD BY AUTOMATED COUNT: 13 % (ref 12.3–15.4)
GFR SERPL CREATININE-BSD FRML MDRD: 59 ML/MIN/1.73
GLOBULIN UR ELPH-MCNC: 2.4 GM/DL
GLUCOSE SERPL-MCNC: 105 MG/DL (ref 65–99)
GLUCOSE UR STRIP-MCNC: NEGATIVE MG/DL
HCT VFR BLD AUTO: 36.5 % (ref 34–46.6)
HGB BLD-MCNC: 11.8 G/DL (ref 12–15.9)
HGB UR QL STRIP.AUTO: NEGATIVE
IMM GRANULOCYTES # BLD AUTO: 0.03 10*3/MM3 (ref 0–0.05)
IMM GRANULOCYTES NFR BLD AUTO: 0.3 % (ref 0–0.5)
KETONES UR QL STRIP: ABNORMAL
LEUKOCYTE ESTERASE UR QL STRIP.AUTO: NEGATIVE
LYMPHOCYTES # BLD AUTO: 2.1 10*3/MM3 (ref 0.7–3.1)
LYMPHOCYTES NFR BLD AUTO: 24.2 % (ref 19.6–45.3)
MAGNESIUM SERPL-MCNC: 2.2 MG/DL (ref 1.6–2.4)
MCH RBC QN AUTO: 31.1 PG (ref 26.6–33)
MCHC RBC AUTO-ENTMCNC: 32.3 G/DL (ref 31.5–35.7)
MCV RBC AUTO: 96.3 FL (ref 79–97)
METHADONE UR QL SCN: NEGATIVE
MONOCYTES # BLD AUTO: 1 10*3/MM3 (ref 0.1–0.9)
MONOCYTES NFR BLD AUTO: 11.5 % (ref 5–12)
NEUTROPHILS NFR BLD AUTO: 5.48 10*3/MM3 (ref 1.7–7)
NEUTROPHILS NFR BLD AUTO: 63.2 % (ref 42.7–76)
NITRITE UR QL STRIP: NEGATIVE
NRBC BLD AUTO-RTO: 0 /100 WBC (ref 0–0.2)
OPIATES UR QL: NEGATIVE
OXYCODONE UR QL SCN: NEGATIVE
PCP UR QL SCN: NEGATIVE
PH UR STRIP.AUTO: 6 [PH] (ref 5–8)
PLATELET # BLD AUTO: 227 10*3/MM3 (ref 140–450)
PMV BLD AUTO: 10 FL (ref 6–12)
POTASSIUM SERPL-SCNC: 4.6 MMOL/L (ref 3.5–5.2)
PROT SERPL-MCNC: 7 G/DL (ref 6–8.5)
PROT UR QL STRIP: NEGATIVE
RBC # BLD AUTO: 3.79 10*6/MM3 (ref 3.77–5.28)
SARS-COV-2 RNA RESP QL NAA+PROBE: NOT DETECTED
SODIUM SERPL-SCNC: 139 MMOL/L (ref 136–145)
SP GR UR STRIP: 1.02 (ref 1–1.03)
TROPONIN T SERPL-MCNC: <0.01 NG/ML (ref 0–0.03)
UROBILINOGEN UR QL STRIP: ABNORMAL
VALPROATE SERPL-MCNC: 25.2 MCG/ML (ref 50–125)
WBC # BLD AUTO: 8.68 10*3/MM3 (ref 3.4–10.8)

## 2020-10-05 PROCEDURE — 81003 URINALYSIS AUTO W/O SCOPE: CPT | Performed by: EMERGENCY MEDICINE

## 2020-10-05 PROCEDURE — 70450 CT HEAD/BRAIN W/O DYE: CPT

## 2020-10-05 PROCEDURE — 80164 ASSAY DIPROPYLACETIC ACD TOT: CPT | Performed by: EMERGENCY MEDICINE

## 2020-10-05 PROCEDURE — P9612 CATHETERIZE FOR URINE SPEC: HCPCS

## 2020-10-05 PROCEDURE — 84484 ASSAY OF TROPONIN QUANT: CPT | Performed by: EMERGENCY MEDICINE

## 2020-10-05 PROCEDURE — 80307 DRUG TEST PRSMV CHEM ANLYZR: CPT | Performed by: FAMILY MEDICINE

## 2020-10-05 PROCEDURE — 70450 CT HEAD/BRAIN W/O DYE: CPT | Performed by: RADIOLOGY

## 2020-10-05 PROCEDURE — 96374 THER/PROPH/DIAG INJ IV PUSH: CPT

## 2020-10-05 PROCEDURE — 87635 SARS-COV-2 COVID-19 AMP PRB: CPT | Performed by: FAMILY MEDICINE

## 2020-10-05 PROCEDURE — 83735 ASSAY OF MAGNESIUM: CPT | Performed by: FAMILY MEDICINE

## 2020-10-05 PROCEDURE — 80053 COMPREHEN METABOLIC PANEL: CPT | Performed by: EMERGENCY MEDICINE

## 2020-10-05 PROCEDURE — 85025 COMPLETE CBC W/AUTO DIFF WBC: CPT | Performed by: EMERGENCY MEDICINE

## 2020-10-05 PROCEDURE — 99285 EMERGENCY DEPT VISIT HI MDM: CPT

## 2020-10-05 PROCEDURE — 25010000002 LORAZEPAM PER 2 MG: Performed by: FAMILY MEDICINE

## 2020-10-05 PROCEDURE — 71045 X-RAY EXAM CHEST 1 VIEW: CPT | Performed by: RADIOLOGY

## 2020-10-05 PROCEDURE — 96372 THER/PROPH/DIAG INJ SC/IM: CPT

## 2020-10-05 PROCEDURE — 25010000002 ZIPRASIDONE MESYLATE PER 10 MG: Performed by: EMERGENCY MEDICINE

## 2020-10-05 PROCEDURE — 71045 X-RAY EXAM CHEST 1 VIEW: CPT

## 2020-10-05 RX ORDER — SODIUM CHLORIDE 0.9 % (FLUSH) 0.9 %
10 SYRINGE (ML) INJECTION AS NEEDED
Status: DISCONTINUED | OUTPATIENT
Start: 2020-10-05 | End: 2020-10-05 | Stop reason: HOSPADM

## 2020-10-05 RX ORDER — LORAZEPAM 2 MG/ML
1 INJECTION INTRAMUSCULAR ONCE
Status: COMPLETED | OUTPATIENT
Start: 2020-10-05 | End: 2020-10-05

## 2020-10-05 RX ADMIN — LORAZEPAM 1 MG: 2 INJECTION, SOLUTION INTRAMUSCULAR; INTRAVENOUS at 21:44

## 2020-10-05 RX ADMIN — WATER 10 MG: 1 INJECTION INTRAMUSCULAR; INTRAVENOUS; SUBCUTANEOUS at 18:41

## 2020-10-05 NOTE — ED PROVIDER NOTES
Subjective   70-year-old white female here for altered mental status.  According to EMS, the patient was found confused and wandering in the woods.  The patient is not able to give accurate history.  She says that she lives in the woods.  She also says that people are trying to kill babies, that her brother is a killer, etc.  No other history of present illness is available          Review of Systems   All other systems reviewed and are negative.      Past Medical History:   Diagnosis Date   • Anxiety    • Arthritis    • Back pain    • Bipolar disorder (CMS/HCC)    • Depression    • Gastritis    • GERD (gastroesophageal reflux disease)    • Heel spur        Allergies   Allergen Reactions   • Cefdinir Shortness Of Breath   • Codeine    • Lyrica [Pregabalin] Mental Status Change     pts stated lyrica gave her horrible dreams    • Morphine And Related    • Sulfa Antibiotics        Past Surgical History:   Procedure Laterality Date   • CERVIX REMOVAL      1975   • GALLBLADDER SURGERY      1990       Family History   Problem Relation Age of Onset   • Heart failure Mother    • Lung cancer Father        Social History     Socioeconomic History   • Marital status: Single     Spouse name: Not on file   • Number of children: Not on file   • Years of education: Not on file   • Highest education level: Not on file   Tobacco Use   • Smoking status: Current Every Day Smoker     Packs/day: 0.25     Types: Cigarettes   • Smokeless tobacco: Never Used   • Tobacco comment: Working on quitting   Substance and Sexual Activity   • Alcohol use: No   • Drug use: No   • Sexual activity: Defer           Objective   Physical Exam  Vitals signs and nursing note reviewed.   Constitutional:       Appearance: She is well-developed and underweight.   HENT:      Head: Normocephalic and atraumatic.   Eyes:      Comments: Patient refused exam.   Cardiovascular:      Rate and Rhythm: Normal rate and regular rhythm.      Heart sounds: No murmur. No  friction rub. No gallop.    Pulmonary:      Effort: Pulmonary effort is normal.      Breath sounds: Normal breath sounds. No wheezing, rhonchi or rales.   Abdominal:      General: Abdomen is flat. Bowel sounds are normal. There is no distension.      Palpations: Abdomen is soft.      Tenderness: There is no abdominal tenderness.   Musculoskeletal:      Right lower leg: No edema.      Left lower leg: No edema.   Skin:     General: Skin is warm and dry.   Neurological:      General: No focal deficit present.      Mental Status: She is alert and oriented to person, place, and time.   Psychiatric:         Mood and Affect: Affect is labile.         Speech: Speech is rapid and pressured and tangential.         Behavior: Behavior is agitated.         Thought Content: Thought content is paranoid and delusional.      Comments: Continuously talking to self/hallucinations.         Procedures  Results for orders placed or performed during the hospital encounter of 10/05/20   COVID-19,CEPHEID,COR/JULIETTE/PAD IN-HOUSE(OR EMERGENT/ADD-ON),NP SWAB IN TRANSPORT MEDIA 3-4 HR TAT - Swab, Nasopharynx    Specimen: Nasopharynx; Swab   Result Value Ref Range    COVID19 Not Detected Not Detected - Ref. Range   Comprehensive Metabolic Panel    Specimen: Blood   Result Value Ref Range    Glucose 105 (H) 65 - 99 mg/dL    BUN 20 8 - 23 mg/dL    Creatinine 0.94 0.57 - 1.00 mg/dL    Sodium 139 136 - 145 mmol/L    Potassium 4.6 3.5 - 5.2 mmol/L    Chloride 102 98 - 107 mmol/L    CO2 23.2 22.0 - 29.0 mmol/L    Calcium 9.5 8.6 - 10.5 mg/dL    Total Protein 7.0 6.0 - 8.5 g/dL    Albumin 4.62 3.50 - 5.20 g/dL    ALT (SGPT) 18 1 - 33 U/L    AST (SGOT) 36 (H) 1 - 32 U/L    Alkaline Phosphatase 67 39 - 117 U/L    Total Bilirubin 0.4 0.0 - 1.2 mg/dL    eGFR Non African Amer 59 (L) >60 mL/min/1.73    Globulin 2.4 gm/dL    A/G Ratio 1.9 g/dL    BUN/Creatinine Ratio 21.3 7.0 - 25.0    Anion Gap 13.8 5.0 - 15.0 mmol/L   Urinalysis With Culture If Indicated -  Urine, Catheter    Specimen: Urine, Catheter   Result Value Ref Range    Color, UA Dark Yellow (A) Yellow, Straw    Appearance, UA Clear Clear    pH, UA 6.0 5.0 - 8.0    Specific Gravity, UA 1.018 1.005 - 1.030    Glucose, UA Negative Negative    Ketones, UA 15 mg/dL (1+) (A) Negative    Bilirubin, UA Small (1+) (A) Negative    Blood, UA Negative Negative    Protein, UA Negative Negative    Leuk Esterase, UA Negative Negative    Nitrite, UA Negative Negative    Urobilinogen, UA 0.2 E.U./dL 0.2 - 1.0 E.U./dL   Troponin    Specimen: Blood   Result Value Ref Range    Troponin T <0.010 0.000 - 0.030 ng/mL   Valproic Acid Level, Total    Specimen: Blood   Result Value Ref Range    Valproic Acid 25.2 (L) 50.0 - 125.0 mcg/mL   CBC Auto Differential    Specimen: Blood   Result Value Ref Range    WBC 8.68 3.40 - 10.80 10*3/mm3    RBC 3.79 3.77 - 5.28 10*6/mm3    Hemoglobin 11.8 (L) 12.0 - 15.9 g/dL    Hematocrit 36.5 34.0 - 46.6 %    MCV 96.3 79.0 - 97.0 fL    MCH 31.1 26.6 - 33.0 pg    MCHC 32.3 31.5 - 35.7 g/dL    RDW 13.0 12.3 - 15.4 %    RDW-SD 46.1 37.0 - 54.0 fl    MPV 10.0 6.0 - 12.0 fL    Platelets 227 140 - 450 10*3/mm3    Neutrophil % 63.2 42.7 - 76.0 %    Lymphocyte % 24.2 19.6 - 45.3 %    Monocyte % 11.5 5.0 - 12.0 %    Eosinophil % 0.3 0.3 - 6.2 %    Basophil % 0.5 0.0 - 1.5 %    Immature Grans % 0.3 0.0 - 0.5 %    Neutrophils, Absolute 5.48 1.70 - 7.00 10*3/mm3    Lymphocytes, Absolute 2.10 0.70 - 3.10 10*3/mm3    Monocytes, Absolute 1.00 (H) 0.10 - 0.90 10*3/mm3    Eosinophils, Absolute 0.03 0.00 - 0.40 10*3/mm3    Basophils, Absolute 0.04 0.00 - 0.20 10*3/mm3    Immature Grans, Absolute 0.03 0.00 - 0.05 10*3/mm3    nRBC 0.0 0.0 - 0.2 /100 WBC   Urine Drug Screen - Urine, Clean Catch    Specimen: Urine, Clean Catch   Result Value Ref Range    Amphetamine Screen, Urine Negative Negative    Barbiturates Screen, Urine Negative Negative    Benzodiazepine Screen, Urine Negative Negative    Cocaine Screen, Urine  Negative Negative    Methadone Screen, Urine Negative Negative    Opiate Screen Negative Negative    Phencyclidine (PCP), Urine Negative Negative    THC, Screen, Urine Positive (A) Negative    6-ACETYL MORPHINE Negative Negative    Buprenorphine, Screen, Urine Negative Negative    Oxycodone Screen, Urine Negative Negative   Magnesium    Specimen: Blood   Result Value Ref Range    Magnesium 2.2 1.6 - 2.4 mg/dL     Ct Head Without Contrast    Result Date: 10/5/2020  Narrative: CT HEAD WO CONTRAST-  CLINICAL INDICATION: Altered mental status   COMPARISON: None available  TECHNIQUE: Axial images of the brain were obtained with out intravenous contrast.  Reformatted images were created in the sagittal and coronal planes.  DOSE:  Radiation dose reduction techniques were utilized per ALARA protocol. Automated exposure control was initiated through either or Room Choice or DoseRight software packages by  protocol.     FINDINGS:  Study degraded by patient motion Today's study shows no mass, hemorrhage, or midline shift. The ventricles, cisterns, and sulci are unremarkable. There is no hydrocephalus. There is no evidence of acute ischemia. I do not see epidural or subdural hematoma. The gray-white differentiation is appropriate. The bone window setting images show no destructive calvarial lesion or acute calvarial fracture. The posterior fossa is unremarkable.       Impression: Study degraded by patient motion No acute intracranial pathology. Nothing is seen on this exam to specifically account for the patient's symptoms.  This report was finalized on 10/5/2020 7:50 PM by Dr. Phil Dillon MD.      Xr Chest 1 View    Result Date: 10/5/2020  Narrative: XR CHEST 1 VW-  CLINICAL INDICATION: Altered mental status   COMPARISON: 02/04/2019  TECHNIQUE: Single frontal view of the chest.  FINDINGS:  There is no focal alveolar infiltrate or effusion. The cardiac silhouette is normal. The pulmonary vasculature is unremarkable.  There is no evidence of an acute osseous abnormality. There are no suspicious-appearing parenchymal soft tissue nodules.       Impression: No evidence of active or acute cardiopulmonary disease on today's chest radiograph.  This report was finalized on 10/5/2020 7:12 PM by Dr. Phil Dillon MD.               ED Course  ED Course as of Oct 06 0007   Mon Oct 05, 2020   1954 Medically stable for psych evaluation    [BC]      ED Course User Index  [BC] Justin Benton MD                                           MDM  Number of Diagnoses or Management Options  Acute psychosis (CMS/HCC): new and requires workup  History of schizophrenia: new and requires workup     Amount and/or Complexity of Data Reviewed  Clinical lab tests: reviewed and ordered  Tests in the radiology section of CPT®: reviewed and ordered  Tests in the medicine section of CPT®: ordered and reviewed  Independent visualization of images, tracings, or specimens: yes    Risk of Complications, Morbidity, and/or Mortality  Presenting problems: high  Diagnostic procedures: high  Management options: high    Patient Progress  Patient progress: stable      Final diagnoses:   Acute psychosis (CMS/HCC)   History of schizophrenia            Sophie Smith DO  10/06/20 0007

## 2020-10-06 PROBLEM — F29 PSYCHOSIS (HCC): Status: ACTIVE | Noted: 2020-10-06

## 2020-10-06 PROCEDURE — 93005 ELECTROCARDIOGRAM TRACING: CPT | Performed by: PSYCHIATRY & NEUROLOGY

## 2020-10-06 PROCEDURE — 99223 1ST HOSP IP/OBS HIGH 75: CPT | Performed by: PSYCHIATRY & NEUROLOGY

## 2020-10-06 PROCEDURE — 93010 ELECTROCARDIOGRAM REPORT: CPT | Performed by: INTERNAL MEDICINE

## 2020-10-06 RX ORDER — BENZONATATE 100 MG/1
100 CAPSULE ORAL 3 TIMES DAILY PRN
Status: DISCONTINUED | OUTPATIENT
Start: 2020-10-06 | End: 2020-10-16 | Stop reason: HOSPADM

## 2020-10-06 RX ORDER — ECHINACEA PURPUREA EXTRACT 125 MG
2 TABLET ORAL AS NEEDED
Status: DISCONTINUED | OUTPATIENT
Start: 2020-10-06 | End: 2020-10-16 | Stop reason: HOSPADM

## 2020-10-06 RX ORDER — LOPERAMIDE HYDROCHLORIDE 2 MG/1
2 CAPSULE ORAL
Status: DISCONTINUED | OUTPATIENT
Start: 2020-10-06 | End: 2020-10-16 | Stop reason: HOSPADM

## 2020-10-06 RX ORDER — ALUMINA, MAGNESIA, AND SIMETHICONE 2400; 2400; 240 MG/30ML; MG/30ML; MG/30ML
15 SUSPENSION ORAL EVERY 6 HOURS PRN
Status: DISCONTINUED | OUTPATIENT
Start: 2020-10-06 | End: 2020-10-16 | Stop reason: HOSPADM

## 2020-10-06 RX ORDER — METOPROLOL SUCCINATE 100 MG/1
100 TABLET, EXTENDED RELEASE ORAL DAILY
COMMUNITY

## 2020-10-06 RX ORDER — ONDANSETRON 4 MG/1
4 TABLET, FILM COATED ORAL EVERY 6 HOURS PRN
Status: DISCONTINUED | OUTPATIENT
Start: 2020-10-06 | End: 2020-10-16 | Stop reason: HOSPADM

## 2020-10-06 RX ORDER — DIVALPROEX SODIUM 500 MG/1
500 TABLET, DELAYED RELEASE ORAL 2 TIMES DAILY
Status: DISCONTINUED | OUTPATIENT
Start: 2020-10-06 | End: 2020-10-16 | Stop reason: HOSPADM

## 2020-10-06 RX ORDER — METOPROLOL SUCCINATE 50 MG/1
100 TABLET, EXTENDED RELEASE ORAL DAILY
Status: DISCONTINUED | OUTPATIENT
Start: 2020-10-06 | End: 2020-10-16 | Stop reason: HOSPADM

## 2020-10-06 RX ORDER — ARIPIPRAZOLE 10 MG/1
5 TABLET ORAL DAILY
Status: DISCONTINUED | OUTPATIENT
Start: 2020-10-06 | End: 2020-10-08

## 2020-10-06 RX ORDER — NICOTINE 21 MG/24HR
1 PATCH, TRANSDERMAL 24 HOURS TRANSDERMAL DAILY
Status: DISCONTINUED | OUTPATIENT
Start: 2020-10-06 | End: 2020-10-16 | Stop reason: HOSPADM

## 2020-10-06 RX ORDER — IBUPROFEN 400 MG/1
400 TABLET ORAL EVERY 6 HOURS PRN
Status: DISCONTINUED | OUTPATIENT
Start: 2020-10-06 | End: 2020-10-16 | Stop reason: HOSPADM

## 2020-10-06 RX ORDER — OLANZAPINE 5 MG/1
5 TABLET, ORALLY DISINTEGRATING ORAL 3 TIMES DAILY PRN
Status: DISCONTINUED | OUTPATIENT
Start: 2020-10-06 | End: 2020-10-16 | Stop reason: HOSPADM

## 2020-10-06 RX ORDER — LORATADINE 10 MG/1
10 TABLET ORAL DAILY
COMMUNITY

## 2020-10-06 RX ORDER — TRAZODONE HYDROCHLORIDE 50 MG/1
12.5 TABLET ORAL NIGHTLY PRN
Status: DISCONTINUED | OUTPATIENT
Start: 2020-10-06 | End: 2020-10-06

## 2020-10-06 RX ORDER — QUETIAPINE FUMARATE 100 MG/1
100 TABLET, FILM COATED ORAL NIGHTLY
Status: DISCONTINUED | OUTPATIENT
Start: 2020-10-06 | End: 2020-10-15

## 2020-10-06 RX ORDER — CETIRIZINE HYDROCHLORIDE 10 MG/1
10 TABLET ORAL DAILY
Status: DISCONTINUED | OUTPATIENT
Start: 2020-10-06 | End: 2020-10-16 | Stop reason: HOSPADM

## 2020-10-06 RX ORDER — LANOLIN ALCOHOL/MO/W.PET/CERES
3 CREAM (GRAM) TOPICAL NIGHTLY PRN
Status: DISCONTINUED | OUTPATIENT
Start: 2020-10-08 | End: 2020-10-16 | Stop reason: HOSPADM

## 2020-10-06 RX ADMIN — DIVALPROEX SODIUM 500 MG: 500 TABLET, DELAYED RELEASE ORAL at 20:32

## 2020-10-06 RX ADMIN — METOPROLOL SUCCINATE 100 MG: 50 TABLET, FILM COATED, EXTENDED RELEASE ORAL at 08:39

## 2020-10-06 RX ADMIN — NICOTINE 1 PATCH: 21 PATCH TRANSDERMAL at 08:38

## 2020-10-06 RX ADMIN — CETIRIZINE HYDROCHLORIDE 10 MG: 10 TABLET, FILM COATED ORAL at 08:39

## 2020-10-06 RX ADMIN — DIVALPROEX SODIUM 500 MG: 500 TABLET, DELAYED RELEASE ORAL at 08:38

## 2020-10-06 RX ADMIN — OLANZAPINE 5 MG: 5 TABLET, ORALLY DISINTEGRATING ORAL at 12:48

## 2020-10-06 RX ADMIN — ARIPIPRAZOLE 5 MG: 10 TABLET ORAL at 12:32

## 2020-10-06 RX ADMIN — QUETIAPINE FUMARATE 100 MG: 100 TABLET ORAL at 20:32

## 2020-10-06 NOTE — NURSING NOTE
"Presented to ED via EMS r/t altered mental status.  Per report, she was found wandering in the woods.  She is oriented to person only.  Is hyper-verbal with rambling speech and flight of ideas.  Makes random statements about how she \"blew up [place],\" losing a black horse, and cutting people's heads off. Per documentation, pt's brother reports that she lives alone, and does okay unless she stops taking her medication. She does not have any family nearby.  Pt is unable to participate with admission assessment.  Only able to answer minimal questions.    "

## 2020-10-06 NOTE — H&P
INITIAL PSYCHIATRIC HISTORY & PHYSICAL    Patient Identification:  Name:  @  Age:   70 y.o.  Sex:   female  :   1950  MRN:   [id number]  Visit Number:   [id number]  Primary Care Physician:   Elizabeth Dunbar APRN    SUBJECTIVE    CC/Focus of Exam: psychosis    HPI: Hamida Cazares is a 70 y.o. female who was admitted on 10/5/2020 with psychosis.    This is 1 of apparently multiple psychiatric hospitalizations, first [facility] admission, for this 70-year-old single never  Social Security disability recipient (greater than 20 years)  female who was brought to the emergency room by the police.According to EMS, the patient was found confused and wandering in the woods.  The patient is not able to give accurate history.  She says that she lives in the woods.  She also says that people are trying to kill babies, that her brother is a killer, etc.  No other history of present illness is available  Patient was totally unable to get any history of her medical or psychiatric difficulties.  She was talking in  a word salad saying that she owned the hospital, had painted all the photographs that are on the wall, no 2 words together seem to be connected.  Also she would repeat questions back instead of offering any intelligible response.  Seemed volatile and angry at times.  Does not seem to be particularly confused.  Any history obtained was from the brother Librado Pinedo ([phone]) who offer what information he had on interview.  Patient apparently has had psychiatric difficulties since her 20s with recurrent episodes of psychosis.  He stated that as long she took her medication she was stable, she visited us this [facility] and was fine, he lives in [place].  A list of her medication he had indicated patient was taking Depakote and Seroquel in relatively low doses of 500 mg and 50 mg daily respectively.  He stated patient was last hospitalized a [facility] in March of this year,  we will be requesting records from there. He first sensed the onset of this episode of psychosis 2 weeks PTA in a text she sent him.  Any subsequent history contained in this report was from the brother.      Available medical/psychiatric records reviewed and incorporated into the current document.     PAST PSYCHIATRIC HX: Repeated psych hospitalization since her 20's.     SUBSTANCE USE HX: No history of Substance Abuse issues. UDS was positive for THC.         FAMILY HX: No History of mental illness or substance abuse or suicide among first degree relatives according to the brother.     SOCIAL HX: Raised in  [facility], no history of abuse, no history of legal difficulties, has never been  no children.  Lives alone and the brother is unaware of any local significant other or support.  Brother is unaware of where the patient is treated as an outpatient if at all.    Past Medical History:   Diagnosis Date   • Anxiety    • Arthritis    • Back pain    • Bipolar disorder (CMS/HCC)    • Depression    • Gastritis    • GERD (gastroesophageal reflux disease)    • Heel spur        Past Surgical History:   Procedure Laterality Date   • CERVIX REMOVAL      1975   • GALLBLADDER SURGERY      1990       Family History   Problem Relation Age of Onset   • Heart failure Mother    • Lung cancer Father          Medications Prior to Admission   Medication Sig Dispense Refill Last Dose   • divalproex (DEPAKOTE) 500 MG DR tablet Take 1 tablet by mouth 2 (Two) Times a Day. 60 tablet 2 Unknown at Unknown time   • loratadine (CLARITIN) 10 MG tablet Take 10 mg by mouth Daily.   Unknown at Unknown time   • metoprolol succinate XL (TOPROL-XL) 100 MG 24 hr tablet Take 100 mg by mouth Daily.   Unknown at Unknown time   • QUEtiapine (SEROquel) 100 MG tablet Take 1 tablet by mouth Every Night. 30 tablet 3 Unknown at Unknown time           ALLERGIES:  Cefdinir, Codeine, Morphine and related, Sulfa antibiotics, and Lyrica  [pregabalin]    Temp:  [97.5 °F (36.4 °C)-98.3 °F (36.8 °C)] 97.5 °F (36.4 °C)  Heart Rate:  [] 113  Resp:  [18] 18  BP: ()/(52-90) 128/84    REVIEW OF SYSTEMS:  Review of Systems   Unable to perform ROS: Psychiatric disorder      See HPI for psychiatric ROS  OBJECTIVE    PHYSICAL EXAM:  Physical Exam  Vitals signs and nursing note reviewed. Exam conducted with a chaperone present.   Constitutional:       Appearance: Normal appearance. She is normal weight.   HENT:      Head: Normocephalic and atraumatic.      Right Ear: External ear normal.      Left Ear: External ear normal.      Nose: Nose normal.      Mouth/Throat:      Pharynx: Oropharynx is clear.   Eyes:      Extraocular Movements: Extraocular movements intact.      Conjunctiva/sclera: Conjunctivae normal.      Pupils: Pupils are equal, round, and reactive to light.   Neck:      Musculoskeletal: Normal range of motion and neck supple.   Cardiovascular:      Rate and Rhythm: Normal rate and regular rhythm.      Heart sounds: Normal heart sounds.   Pulmonary:      Effort: Pulmonary effort is normal.      Breath sounds: Normal breath sounds.   Abdominal:      General: Abdomen is flat.      Palpations: Abdomen is soft.   Genitourinary:     Comments: GYN and breast exam deferred  Musculoskeletal: Normal range of motion.   Skin:     General: Skin is warm and dry.   Neurological:      General: No focal deficit present.      Mental Status: She is alert and oriented to person, place, and time.         MENTAL STATUS EXAM:               Cooperation:  Guarded and suspicious antagonistic  Eye Contact:  Fair  Psychomotor Behavior:  Aggitated  Affect:  Volatile  Hopelessness: Denies  Speech:  Pressured  Thought Progress:  Word salad  Thought Content:  Bizarre  Suicidal:  None  Homicidal:  None  Hallucinations:  Unable to be sure due to patient's antagonistic response to questions, does not any  Delusion:  Paranoid and Grandiose  Memory:  Unable to  evaluate  Orientation:  Patient seen and oriented but difficult to be specific  Reliability:  poor  Insight:  None  Judgement:  Impaired  Impulse Control:  Poor.prescribed      Imaging Results (Last 24 Hours)     ** No results found for the last 24 hours. **           ECG/EMG Results (most recent)     Procedure Component Value Units Date/Time    ECG 12 Lead [[id number]] Collected: 10/06/20 0318     Updated: 10/06/20 0325    Narrative:      Test Reason : Baseline Cardiac Status  Blood Pressure : **/** mmHG  Vent. Rate : 104 BPM     Atrial Rate : 104 BPM     P-R Int : 102 ms          QRS Dur : 082 ms      QT Int : 346 ms       P-R-T Axes : 040 029 057 degrees     QTc Int : 454 ms    Sinus tachycardia with short MA  Nonspecific ST abnormality  Abnormal ECG  No previous ECGs available    Referred By:  CORAL           Confirmed By:            Lab Results   Component Value Date    GLUCOSE 105 (H) 10/05/2020    BUN 20 10/05/2020    CREATININE 0.94 10/05/2020    EGFRIFNONA 59 (L) 10/05/2020    BCR 21.3 10/05/2020    CO2 23.2 10/05/2020    CALCIUM 9.5 10/05/2020    ALBUMIN 4.62 10/05/2020    AST 36 (H) 10/05/2020    ALT 18 10/05/2020       Lab Results   Component Value Date    WBC 8.68 10/05/2020    HGB 11.8 (L) 10/05/2020    HCT 36.5 10/05/2020    MCV 96.3 10/05/2020     10/05/2020       Pain Management Panel     Pain Management Panel Latest Ref Rng & Units 10/5/2020    AMPHETAMINES SCREEN, URINE Negative Negative    BARBITURATES SCREEN Negative Negative    BENZODIAZEPINE SCREEN, URINE Negative Negative    BUPRENORPHINEUR Negative Negative    COCAINE SCREEN, URINE Negative Negative    METHADONE SCREEN, URINE Negative Negative          Brief Urine Lab Results  (Last result in the past 365 days)      Color   Clarity   Blood   Leuk Est   Nitrite   Protein   CREAT   Urine HCG        10/05/20 1843 Dark Yellow Clear Negative Negative Negative Negative               Reviewed labs and studies done with this admission.        ASSESSMENT & PLAN:        Psychosis (CMS/HCC)  Most certainly either manic phase of bipolar disorder or schizophrenia, more detailed history will make the diagnosis, will initiate treatment with Abilify and Depakote supplemented by MATTHEW Langford, send for records from [facility] and seeking more information from local sources if possible.  Will provide for supportive psychotherapeutic effort.     The patient has been admitted for safety and stabilization.  Patient will be monitored for suicidality daily and maintained on Special Precautions Level 3 (q15 min checks) . The patient will have individual and group therapy with a master's level therapist. A master treatment plan will be developed and agreed upon by the patient and his/her treatment team.  The patient's estimated length of stay in the hospital is 5-7 days.       I spent a total of 75 minutes in direct patient care, greater than 42 minutes (greater than 50%) were spent face-to-face with assessment, coordination of care, counseling,  and answering any questions the patient had regarding her status and psychosis and the treatment plan.     TOM Fuentes MD    10/06/20  11:07 AM EDT

## 2020-10-06 NOTE — NURSING NOTE
Called Librado Pinedo, pt's emergency contact and brother.  He stated that pt lives alone and usually does fine, except when she stops taking her medication.  He states that he lives in [place], [place] and that pt does not have any other family around.  His phone number is [phone]

## 2020-10-06 NOTE — PLAN OF CARE
Goal Outcome Evaluation:  Plan of Care Reviewed With: patient  Progress: no change  Outcome Summary: New admit; psychosis. Patient was found confused and wandering in the woods by police. EMS brought patient to ED due to altered mental status. Patient recieved PRN injections in the ED for psychosis and agitation. Patient has been lying in bed laughing and talking to self with rambling speech.

## 2020-10-06 NOTE — PLAN OF CARE
Goal Outcome Evaluation:  Plan of Care Reviewed With: patient  Progress: no change  Outcome Summary: Patient is in state of psychosis. Patient will yell at staff, is unable to make complete sentences; word salad. Patient wants to leave and does not understand why she is in the hospital. Patient is alert to self only. Patient is eating well with poor sleep noted. Patient also noted responding to people when no one is there. No acute distress noted, will continue to monitor.

## 2020-10-06 NOTE — PLAN OF CARE
Problem: Adult Behavioral Health Plan of Care  Goal: Plan of Care Review  Outcome: Ongoing, Progressing  Flowsheets (Taken 10/6/2020 1542)  Consent Given to Review Plan with: Patient unable to participate  Progress: no change  Plan of Care Reviewed With: patient  Patient Agreement with Plan of Care: unable to participate  Outcome Summary: New admit. Completed social history and integrated summary this date.  Goal: Patient-Specific Goal (Individualization)  Outcome: Ongoing, Progressing  Flowsheets  Taken 10/6/2020 1542 by Patricia Haynes  Patient-Specific Goals (Include Timeframe): Patient will deny SI/HI prior to discharge. Patient will identify 1 healthy coping skill for depression prior to discharge.  Patient will consent to appropriate aftercare plan prior to discharge.  Taken 10/6/2020 1537 by Patricia Haynes  Patient Personal Strengths: family/social support  Patient Vulnerabilities:   limited social skills   lacks insight into illness   poor impulse control  Taken 10/6/2020 0037 by Jeny Hdz RN  Individualized Care Needs: Altered mental status at time of admission  Anxieties, Fears or Concerns: None verbalized  Goal: Optimized Coping Skills in Response to Life Stressors  Outcome: Ongoing, Progressing  Intervention: Promote Effective Coping Strategies  Flowsheets (Taken 10/6/2020 0720 by Laly Alicia, RN)  Supportive Measures:   active listening utilized   counseling provided   decision-making supported   goal setting facilitated   positive reinforcement provided   problem-solving facilitated   relaxation techniques promoted   self-care encouraged   self-reflection promoted   self-responsibility promoted   verbalization of feelings encouraged  Goal: Develops/Participates in Therapeutic Billings to Support Successful Transition  Intervention: Foster Therapeutic Billings  Flowsheets (Taken 10/6/2020 0720 by Laly Alicia, RN)  Trust Relationship/Rapport:    questions answered   care explained   choices provided   emotional support provided   empathic listening provided   questions encouraged   reassurance provided   thoughts/feelings acknowledged  Intervention: Mutually Develop Transition Plan  Flowsheets  Taken 10/6/2020 1542  Outpatient/Agency/Support Group Needs:   outpatient medication management   outpatient psychiatric care (specify)   outpatient counseling  Transition Support:   community resources reviewed   follow-up care discussed   crisis management plan promoted   crisis management plan verbalized   follow-up care coordinated  Anticipated Discharge Disposition: home or self-care  Taken 10/6/2020 1540  Discharge Coordination/Progress: Patient has insurance. She is unable to participate in discharge planning and will asked to consider outpatieent psych options.  Concerns Comments: Patient would benefit from outpatient psych referral.  Transportation Anticipated: family or friend will provide  Transportation Concerns: car, none  Current Discharge Risk: psychiatric illness  Concerns to be Addressed:   mental health   compliance issue   cognitive/perceptual   coping/stress  Readmission Within the Last 30 Days: no previous admission in last 30 days  Patient/Family Anticipated Services at Transition:   mental health services   outpatient care  Patient's Choice of Community Agency(s): Patient unable to participate this date. Will recomment outpatient psych options  Patient/Family Anticipates Transition to: home  Offered/Gave Vendor List: no

## 2020-10-06 NOTE — PROGRESS NOTES
DATA:         Therapist attempted to meet individually with patient this date. However, patient unable to participate due to mental status.  Initial information taken from medical record.      Clinical Maneuvering/Intervention:     Therapist staffed case with Dr. Fuentes and reviewed medical chart.      Therapist completed integrated summary, treatment plan, and initiated social history this date.  Therapist is strongly encouraging family involvement in treatment.       ASSESSMENT:      The patient is a 70 year old  female. Attempted to meet 1-1 with patient this date. However, she appeared hyper-talkative and psychotic this date. Patient requiring PRN medication this date.  Per report, patient brought to the emergency room by the police. Patient was found confused and wandering in the woods.  The patient is not able to give accurate history today.  Patient noted to talk in word salad.  Dr. Fuentes contacted patient's brotherLibrado Pinedo ([phone]) who offer what information he had on interview.  Patient apparently has had psychiatric difficulties since her 20s with recurrent episodes of psychosis.  He stated that as long she took her medication she was stable. Dr. Fuentes in the process of obtaining records from patient's previous hospitalization at [facility].  Patient unable to participate in assessment or discharge planning this date.     PLAN:       Patient to remain hospitalized this date.     Treatment team will focus efforts on stabilizing patient's acute symptoms while providing education on healthy coping and crisis management to reduce hospitalizations.   Patient requires daily psychiatrist evaluation and 24/7 nursing supervision to promote patient  safety.     Therapist will offer 1-4 individual sessions, 1 therapy group daily, family education, and appropriate referral.    Therapist recommends possible outpatient psych referral.  Will continue assessment and attempt to obtain necessary  consents for planning and family involvement.

## 2020-10-06 NOTE — NURSING NOTE
Patient becoming more anxious and agitated at present. PRN medication administered at this time. Will continue to monitor closely.

## 2020-10-06 NOTE — NURSING NOTE
Intake assessment completed.  Pt is a poor historian and unable to answer any questions appropriately.   Pt was brought in today to ED by police and they reported finding the pt in the woods alone in her bath robe with altered mental status.      Pt is unable to answer questions and is disoriented to situation, but is able to tell her birthday and the date.      Pt brother was called and he stated that she does this when she stops taking her medication.  States that she lives alone and that she usually takes care of herself.

## 2020-10-06 NOTE — NURSING NOTE
Pt arrived to intake from ED bed. Helped to restroom for urine collection.  Pt was changed and searched per 2 staff in ED room. Personal belongings placed in safe storage. Safety precautions in place.

## 2020-10-06 NOTE — NURSING NOTE
Spoke with Dr. Fuentes. Discussed assessment, vitals and labs.  New orders to admit pt to [facility], Routine orders. SP3. [id number]

## 2020-10-06 NOTE — NURSING NOTE
Patient is refusing all attempts to draw labs this morning. Several attempts made by staff and patient will not allow it.

## 2020-10-06 NOTE — NURSING NOTE
Patient is actively talking to someone not there. Patient noted with word salad when speaking. Patient is paranoid at this time. Will continue to monitor.

## 2020-10-07 PROCEDURE — 99232 SBSQ HOSP IP/OBS MODERATE 35: CPT | Performed by: PSYCHIATRY & NEUROLOGY

## 2020-10-07 PROCEDURE — 25010000002 LORAZEPAM PER 2 MG: Performed by: PSYCHIATRY & NEUROLOGY

## 2020-10-07 PROCEDURE — 25010000002 ZIPRASIDONE MESYLATE PER 10 MG: Performed by: PSYCHIATRY & NEUROLOGY

## 2020-10-07 RX ORDER — WATER 1000 ML/1000ML
INJECTION, SOLUTION INTRAVENOUS
Status: COMPLETED
Start: 2020-10-07 | End: 2020-10-07

## 2020-10-07 RX ORDER — ATORVASTATIN CALCIUM 40 MG/1
40 TABLET, FILM COATED ORAL NIGHTLY
Status: DISCONTINUED | OUTPATIENT
Start: 2020-10-07 | End: 2020-10-16 | Stop reason: HOSPADM

## 2020-10-07 RX ORDER — LORAZEPAM 2 MG/ML
1 INJECTION INTRAMUSCULAR 2 TIMES DAILY PRN
Status: DISCONTINUED | OUTPATIENT
Start: 2020-10-07 | End: 2020-10-07

## 2020-10-07 RX ORDER — LORAZEPAM 2 MG/ML
1 INJECTION INTRAMUSCULAR 2 TIMES DAILY PRN
Status: DISCONTINUED | OUTPATIENT
Start: 2020-10-07 | End: 2020-10-16 | Stop reason: HOSPADM

## 2020-10-07 RX ADMIN — DIVALPROEX SODIUM 500 MG: 500 TABLET, DELAYED RELEASE ORAL at 20:29

## 2020-10-07 RX ADMIN — QUETIAPINE FUMARATE 100 MG: 100 TABLET ORAL at 20:29

## 2020-10-07 RX ADMIN — LORAZEPAM 1 MG: 2 INJECTION INTRAMUSCULAR; INTRAVENOUS at 14:26

## 2020-10-07 RX ADMIN — WATER 10 MG: 1 INJECTION INTRAMUSCULAR; INTRAVENOUS; SUBCUTANEOUS at 14:25

## 2020-10-07 RX ADMIN — WATER 10 ML: 1 INJECTION INTRAMUSCULAR; INTRAVENOUS; SUBCUTANEOUS at 14:25

## 2020-10-07 NOTE — NURSING NOTE
[facility] medical records will not sent notes  because this is a psych patient and her consent was not signed and they did not understand what was on release of information form.  They wanted to know if patient has POA and if they consented for information they would send.  Thus far patient refusing to sign release of information.

## 2020-10-07 NOTE — PROGRESS NOTES
"INPATIENT PSYCHIATRIC PROGRESS NOTE    Name:  Hamida Cazares  :  1950  MRN:  [id number]  Visit Number:  [id number]  Length of stay:  2    Behavioral Health Treatment Plan and Problem List: I have reviewed and approved the Behavioral Health Treatment Plan and Problem list.    SUBJECTIVE    CC/ Focus of exam: psychosis    Patient's subjective status: \"Who are you\"     INTERVAL HISTORY: Patient remains agitated, tangential, bizarre and uncooperative.  Extremely antagonistic and negative with her remarks.  Unable to establish any rapport with this patient.  She is refused medications and walks away from interview. If unable to gain her cooperation with even the basic treatment efforts such as medications will have to proceed with involuntary commitment to [facility] tomorrow where the court can intervene.     Was able to talk with the patient's PCP who had last saw the patient in November of last year. No new information. No response as yet from efforts for info from [facility] or [facility]. Nursing continuing efforts to do so.     Depression rating refuses to rate   anxiety rating patient refuses to rate  Sleep: 5 hours         Review of Systems   Respiratory: Negative.    Cardiovascular: Negative.    Neurological: Negative.          OBJECTIVE    Temp:  [97.6 °F (36.4 °C)-98.2 °F (36.8 °C)] 97.8 °F (36.6 °C)  Heart Rate:  [82-92] 83  Resp:  [16-20] 16  BP: (126-140)/(75-94) 140/94    MENTAL STATUS EXAM:  Report      Psychomotor: No psychomotor agitation/retardation, No EPS, No motor tics  Speech-normal rate, amount.  Mood/Affect:  Angry  Thought Processes:  flight of ideas  Thought Content:  dilussional, paranoid, negativistic and bizarre  Hallucination(s): Patient denies  Hopelessness: No  Optimistic:No  Suicidal Thoughts:   No  Suicidal Plan/Intent:  No  Homicidal Thoughts:  absent  Orientation: From what she volunteers she seems to be oriented to place and person  Memory: Grossly seems intact does not seem confused, but " uncooperative    Lab Results (last 24 hours)     ** No results found for the last 24 hours. **           Imaging Results (Last 24 Hours)     ** No results found for the last 24 hours. **           ECG/EMG Results (most recent)     Procedure Component Value Units Date/Time    ECG 12 Lead [[id number]] Collected: 10/06/20 0318     Updated: 10/06/20 1254    Narrative:      Test Reason : Baseline Cardiac Status  Blood Pressure : **/** mmHG  Vent. Rate : 104 BPM     Atrial Rate : 104 BPM     P-R Int : 102 ms          QRS Dur : 082 ms      QT Int : 346 ms       P-R-T Axes : 040 029 057 degrees     QTc Int : 454 ms    Sinus tachycardia with short NM  Nonspecific ST abnormality  Abnormal ECG  No previous ECGs available  Confirmed by Faheem Valente (2020) on 10/6/2020 12:54:29 PM    Referred By:  CORAL           Confirmed By:Faheem Valente           ALLERGIES: Cefdinir, Codeine, Morphine and related, Sulfa antibiotics, and Lyrica [pregabalin]      Current Facility-Administered Medications:   •  aluminum-magnesium hydroxide-simethicone (MAALOX MAX) 400-400-40 MG/5ML suspension 15 mL, 15 mL, Oral, Q6H PRN, Dez Fuentes MD  •  ARIPiprazole (ABILIFY) tablet 5 mg, 5 mg, Oral, Daily, Adams Fuentes MD, 5 mg at 10/06/20 1232  •  atorvastatin (LIPITOR) tablet 40 mg, 40 mg, Oral, Nightly, Adams Fuentes MD  •  benzonatate (TESSALON) capsule 100 mg, 100 mg, Oral, TID PRN, Dez Fuentes MD  •  cetirizine (zyrTEC) tablet 10 mg, 10 mg, Oral, Daily, Dez Fuentes MD, 10 mg at 10/06/20 0839  •  divalproex (DEPAKOTE) DR tablet 500 mg, 500 mg, Oral, BID, Dez Fuentes MD, 500 mg at 10/06/20 2032  •  ibuprofen (ADVIL,MOTRIN) tablet 400 mg, 400 mg, Oral, Q6H PRN, Dez Fuentes MD  •  loperamide (IMODIUM) capsule 2 mg, 2 mg, Oral, Q2H PRN, Dez Fuentes MD  •  LORazepam (ATIVAN) injection 1 mg, 1 mg, Intramuscular, BID PRN, Adams Fuentes MD, 1 mg at 10/07/20 1426  •  magnesium  hydroxide (MILK OF MAGNESIA) suspension 2400 mg/10mL 10 mL, 10 mL, Oral, Daily PRN, Dez Fuentes MD  •  [START ON 10/8/2020] melatonin tablet 3 mg, 3 mg, Oral, Nightly PRN, Dze Fuentes MD  •  metoprolol succinate XL (TOPROL-XL) 24 hr tablet 100 mg, 100 mg, Oral, Daily, Dez Fuentes MD, 100 mg at 10/06/20 0839  •  nicotine (NICODERM CQ) 21 MG/24HR patch 1 patch, 1 patch, Transdermal, Daily, Dez Fuentes MD, 1 patch at 10/06/20 0838  •  OLANZapine zydis (zyPREXA) disintegrating tablet 5 mg, 5 mg, Oral, TID PRN, Adams Fuentes MD, 5 mg at 10/06/20 1248  •  ondansetron (ZOFRAN) tablet 4 mg, 4 mg, Oral, Q6H PRN, Dez Fuentes MD  •  QUEtiapine (SEROquel) tablet 100 mg, 100 mg, Oral, Nightly, Dez Fuentes MD, 100 mg at 10/06/20 2032  •  sodium chloride nasal spray 2 spray, 2 spray, Each Nare, PRN, Dez Fuentes MD  •  ziprasidone (GEODON) 10 mg in sterile water (preservative free) 0.5 mL injection, 10 mg, Intramuscular, Q2H PRN, Adams Fuentes MD, 10 mg at 10/07/20 1425    ASSESSMENT & PLAN    Psychosis (CMS/HCC)  Most certainly either manic phase of bipolar disorder or schizophrenia, more detailed history will make the diagnosis, will initiate treatment with Abilify and Depakote supplemented by PRN Zyprexa and use necessitated by combative behavior IM Geodon/Ativan. Sent for records from [facility] and seeking more information from local sources if possible.  Will provide for supportive therapeutic effort.       Special precautions: Special Precautions Level 3 (q15 min checks)     Behavioral Health Treatment Plan and Problem List: I have reviewed and approved the Behavioral Health Treatment Plan and Problem list.    I spent a total of 30 minutes in direct patient care including  18 minutes face to face with the patient assessment, coordination of care, and counseling the patient on the current and follow-up treatment plans regarding her status.      TOM Fuentes  MD    Clinician:  Adams Fuentes MD  10/07/20  14:41 EDT    Dictated utilizing Dragon dictation

## 2020-10-07 NOTE — NURSING NOTE
"Pt initially refused 2100 Depakote, stating that \"...I get mine from [facility] and they are gray, not pink.\" Explained to pt that is still the same medication just from another . Pt then put the pill in her hand and said she would take it \"after the police get here.\" Adv that she had to either take it or give it back. After some discussion pt finally took med, stating that \"..if I die from this it'll be on your head.\" Pt very argumentative and insistent that she get a flu shot and some vitamin C because \"...I think I'm coming down with a cold.\"     "

## 2020-10-07 NOTE — NURSING NOTE
Writer received a phone call from [facility] dispatch, alerting us that the patient had called 911 and stated that she was trapped, we had her locked up, that there was a man walking around with a paddle that was trying to have sex with her, we were stealing all her money, the nurses were very mean and abusing her and she needed someone to come help her.

## 2020-10-07 NOTE — PLAN OF CARE
Problem: Adult Behavioral Health Plan of Care  Goal: Plan of Care Review  10/7/2020 1630 by Mattie Luo, RN  Outcome: Ongoing, Progressing  10/7/2020 1630 by Mattie Luo RN  Outcome: Ongoing, Progressing  10/7/2020 1626 by Mattie Luo RN  Outcome: Ongoing, Progressing  10/7/2020 1625 by Mattie Luo RN  Outcome: Ongoing, Progressing   Goal Outcome Evaluation:  Plan of Care Reviewed With: patient  Progress: improving  Outcome Summary: Patient has been very aggressive, threatening and cursing at staff today.  Very agitated, difficult to redirect and not taking po medications.  Injections today with Ativan 1mg and Geodon 10mg which barely phased patient.

## 2020-10-07 NOTE — NURSING NOTE
"Patient came out of her room  yelling at med nurse \"I'm going to kill you, why do u have all these computers around up here on me/\" (patient pointing at ceiling.)  Patient going toward medication nurse and MHT held her hand out and patient backed off.  Patient was asked to go to her room because for safety she needed some injections.  Patient yelling \"call my brother Hitesh and [employer] for me, they r going to cut my water off.\"  Security called and when arrived to floor they escorted patient to her room and she laid on her side willingly and took two injections of Ativan 1mg and Geodon 10mg IM. Had face buried in pillow crying but would not acknowledge staff to comfort her, wanted everyone to leave.  "

## 2020-10-07 NOTE — NURSING NOTE
Patient very psychotic this am, cursing at staff, threatening staff, calling us names and wanting us to pay her water bill because she cannot afford to pay it here.  Taking signs off walls, pacing and rambling.  Contacted [facility] to try obtain information regarding meds.

## 2020-10-07 NOTE — PLAN OF CARE
"  Problem: Adult Behavioral Health Plan of Care  Goal: Optimized Coping Skills in Response to Life Stressors  Intervention: Promote Effective Coping Strategies  Flowsheets (Taken 10/7/2020 1218)  Supportive Measures:  • active listening utilized  • self-responsibility promoted  • verbalization of feelings encouraged  • relaxation techniques promoted     Problem: Adult Behavioral Health Plan of Care  Goal: Develops/Participates in Therapeutic [place] to Support Successful Transition  Intervention: Foster Therapeutic [place]  Flowsheets (Taken 10/7/2020 1218)  Trust Relationship/Rapport:  • care explained  • choices provided  • emotional support provided  • empathic listening provided  • questions answered  • thoughts/feelings acknowledged  • reassurance provided  • questions encouraged     Problem: Adult Behavioral Health Plan of Care  Goal: Develops/Participates in Therapeutic [place] to Support Successful Transition  Intervention: Mutually Develop Transition Plan  Flowsheets (Taken 10/7/2020 1218)  Transition Support: (Dr Fuentes discussed with patient that she may be transferred to [place] if she does not start becoming compliant with her medications.) community resources reviewed    Data: Therapist staffed case with Dr Fuentes this date. Patient has not been compliant with medications; threatening staff; cursing this date. Patient was encouraged to take her medications and informed that if she does not start taking her medications then she would be transferred to [place]. She said \"your crazy; go and sit down\".     Therapist introduced myself to patient. She said \"your not my therapist; I don't have one in this hospital. Patient stated that she needs to get home to pay her bills. Encouraged patient to allow us to contact her brother this date and she said \"absolutely not; I've got money and I'm 70 years old and I can take care of  My business\".      Later this afternoon patient demanding to use the phone to call " "about bills. She charged at nursing staff threatening to hit them. She then started to demand ativan and said \"I'm not taking a shot\".   According to nursing staff the patient called 911 last night to come and get her out of the hospital.    Assessment: Patient is demanding; using profanity; threatening to staff this date. Patient is not cooperative. Patient displays confusion and randomly changes subject. Started talking about the food she was eating that I apparently made and then changed subject to talking about not to have sex because it says so in the Bible.     Plan: Will most likely transfer the patient to [facility] if she does not stabilize and begin compliance with medications.     "

## 2020-10-08 PROCEDURE — 99232 SBSQ HOSP IP/OBS MODERATE 35: CPT | Performed by: PSYCHIATRY & NEUROLOGY

## 2020-10-08 RX ORDER — ARIPIPRAZOLE 10 MG/1
10 TABLET ORAL DAILY
Status: DISCONTINUED | OUTPATIENT
Start: 2020-10-09 | End: 2020-10-13

## 2020-10-08 RX ORDER — PANTOPRAZOLE SODIUM 40 MG/1
40 TABLET, DELAYED RELEASE ORAL
Status: DISCONTINUED | OUTPATIENT
Start: 2020-10-08 | End: 2020-10-16 | Stop reason: HOSPADM

## 2020-10-08 RX ADMIN — DIVALPROEX SODIUM 500 MG: 500 TABLET, DELAYED RELEASE ORAL at 09:34

## 2020-10-08 RX ADMIN — QUETIAPINE FUMARATE 100 MG: 100 TABLET ORAL at 20:33

## 2020-10-08 RX ADMIN — DIVALPROEX SODIUM 500 MG: 500 TABLET, DELAYED RELEASE ORAL at 20:33

## 2020-10-08 RX ADMIN — CETIRIZINE HYDROCHLORIDE 10 MG: 10 TABLET, FILM COATED ORAL at 09:34

## 2020-10-08 RX ADMIN — METOPROLOL SUCCINATE 100 MG: 50 TABLET, FILM COATED, EXTENDED RELEASE ORAL at 09:34

## 2020-10-08 RX ADMIN — PANTOPRAZOLE SODIUM 40 MG: 40 TABLET, DELAYED RELEASE ORAL at 12:20

## 2020-10-08 RX ADMIN — ARIPIPRAZOLE 5 MG: 10 TABLET ORAL at 09:34

## 2020-10-08 NOTE — PLAN OF CARE
"Goal Outcome Evaluation:  Plan of Care Reviewed With: patient  Progress: no change   Patient uncooperative and unable to participate in assessment completely, she was delusional and talking about being a genius and a horse on [facility], she kept telling me to \"get my meds right, call Meenakshi because she knows what I take\", patient rated anxiety 2/10, denied depression, denies SI/HI, she is disoriented x4 and restless, she slept some but was agitated and got up early and continued to be delusional.   "

## 2020-10-08 NOTE — PROGRESS NOTES
"INPATIENT PSYCHIATRIC PROGRESS NOTE    Name:  Hamida Cazares  :  1950  MRN:  [id number]  Visit Number:  [id number]  Length of stay:  3    Behavioral Health Treatment Plan and Problem List: I have reviewed and approved the Behavioral Health Treatment Plan and Problem list.    SUBJECTIVE    CC/ Focus of exam: Psychosis    Patient's subjective status: \"Who are you\"    INTERVAL HISTORY: Once again antagonistic and bizarre with grandiose delusions given to making demands.  At interview patient refused medication BUT soon after this session she went to the medication nurse and asked for and took medications prescribed thereby avoiding the necessity of involuntary transfer to [facility].     Depression rating patient refused to answer appropriately  Anxiety rating patient refused to participate in evaluation appropriately   Sleep: 3 to 4 hours interrupted sleep     Review of Systems   Respiratory: Negative.    Cardiovascular: Negative.    Neurological: Negative.          OBJECTIVE    Temp:  [96.9 °F (36.1 °C)-97.8 °F (36.6 °C)] 97.7 °F (36.5 °C)  Heart Rate:  [] 98  Resp:  [16-20] 20  BP: (140-164)/(88-94) 164/88    MENTAL STATUS EXAM:        Psychomotor: No psychomotor agitation/retardation, No EPS, No motor tics  Speech-normal rate, amount.  Mood/Affect:  Angry and Inappropriate  Thought Processes:  loose associations  Thought Content:  paranoid, grandiose and bizarre  Hallucination(s): None demonstrated  Hopelessness: No  Optimistic:No  Suicidal Thoughts:   No  Suicidal Plan/Intent:  No  Homicidal Thoughts:  absent  Orientation: oriented x 3  Memory: recent intact    Lab Results (last 24 hours)     ** No results found for the last 24 hours. **           Imaging Results (Last 24 Hours)     ** No results found for the last 24 hours. **           ECG/EMG Results (most recent)     Procedure Component Value Units Date/Time    ECG 12 Lead [[id number]] Collected: 10/06/20 0318     Updated: 10/06/20 1254    Narrative:   "    Test Reason : Baseline Cardiac Status  Blood Pressure : **/** mmHG  Vent. Rate : 104 BPM     Atrial Rate : 104 BPM     P-R Int : 102 ms          QRS Dur : 082 ms      QT Int : 346 ms       P-R-T Axes : 040 029 057 degrees     QTc Int : 454 ms    Sinus tachycardia with short [place]  Nonspecific ST abnormality  Abnormal ECG  No previous ECGs available  Confirmed by Faheem Valente (2020) on 10/6/2020 12:54:29 PM    Referred By:  CORAL           Confirmed By:Faheem Valente           ALLERGIES: Cefdinir, Codeine, Morphine and related, Sulfa antibiotics, and Lyrica [pregabalin]      Current Facility-Administered Medications:   •  aluminum-magnesium hydroxide-simethicone (MAALOX MAX) 400-400-40 MG/5ML suspension 15 mL, 15 mL, Oral, Q6H PRN, Dez Fuentes MD  •  ARIPiprazole (ABILIFY) tablet 5 mg, 5 mg, Oral, Daily, Adams Fuentes MD, 5 mg at 10/08/20 0934  •  atorvastatin (LIPITOR) tablet 40 mg, 40 mg, Oral, Nightly, Adams Fuentes MD  •  benzonatate (TESSALON) capsule 100 mg, 100 mg, Oral, TID PRN, Dez Fuentes MD  •  cetirizine (zyrTEC) tablet 10 mg, 10 mg, Oral, Daily, Dez Fuentes MD, 10 mg at 10/08/20 0934  •  divalproex (DEPAKOTE) DR tablet 500 mg, 500 mg, Oral, BID, Dez Fuentes MD, 500 mg at 10/08/20 0934  •  ibuprofen (ADVIL,MOTRIN) tablet 400 mg, 400 mg, Oral, Q6H PRN, Dez Fuentes MD  •  loperamide (IMODIUM) capsule 2 mg, 2 mg, Oral, Q2H PRN, Dez Fuentes MD  •  LORazepam (ATIVAN) injection 1 mg, 1 mg, Intramuscular, BID PRN, Adams Fuentes MD, 1 mg at 10/07/20 1426  •  magnesium hydroxide (MILK OF MAGNESIA) suspension 2400 mg/10mL 10 mL, 10 mL, Oral, Daily PRN, Dez Fuentes MD  •  melatonin tablet 3 mg, 3 mg, Oral, Nightly PRN, Dez Fuentes MD  •  metoprolol succinate XL (TOPROL-XL) 24 hr tablet 100 mg, 100 mg, Oral, Daily, Dez Fuentes MD, 100 mg at 10/08/20 0934  •  nicotine (NICODERM CQ) 21 MG/24HR patch 1 patch, 1 patch,  Transdermal, Daily, Dez Fuentes MD, 1 patch at 10/06/20 0838  •  OLANZapine zydis (zyPREXA) disintegrating tablet 5 mg, 5 mg, Oral, TID PRN, Adams Fuentes MD, 5 mg at 10/06/20 1248  •  ondansetron (ZOFRAN) tablet 4 mg, 4 mg, Oral, Q6H PRN, Dez Fuentes MD  •  QUEtiapine (SEROquel) tablet 100 mg, 100 mg, Oral, Nightly, Dez Fuentes MD, 100 mg at 10/07/20 2029  •  sodium chloride nasal spray 2 spray, 2 spray, Each Nare, PRN, Dez Fuentes MD  •  ziprasidone (GEODON) 10 mg in sterile water (preservative free) 0.5 mL injection, 10 mg, Intramuscular, Q2H PRN, Adams Fuentes MD, 10 mg at 10/07/20 1425    ASSESSMENT & PLAN    Psychosis (CMS/[facility])  Most certainly either manic phase of bipolar disorder or schizophrenia, more detailed history will make the diagnosis, will initiate treatment with Abilify and Depakote supplemented by PRN Zyprexa and use necessitated by combative behavior IM Geodon/Ativan. Sent for records from [facility] and seeking more information from local sources if possible.  Will provide for supportive therapeutic effort.       Special precautions: Special Precautions Level 3 (q15 min checks)     Behavioral Health Treatment Plan and Problem List: I have reviewed and approved the Behavioral Health Treatment Plan and Problem list.    I spent a total of 26 minutes in direct patient care including 17 minutes face to face with the patient assessment, coordination of care, and counseling the patient on the current and follow-up treatment plans regarding her status.   TOM Fuentes MD    Clinician:  Adams Fuentes MD  10/08/20  10:35 EDT    Dictated utilizing Dragon dictation

## 2020-10-08 NOTE — PLAN OF CARE
"  Problem: Adult Behavioral Health Plan of Care  Goal: Optimized Coping Skills in Response to Life Stressors  Intervention: Promote Effective Coping Strategies  Flowsheets (Taken 10/8/2020 1550)  Supportive Measures:   active listening utilized   decision-making supported   self-care encouraged   relaxation techniques promoted   verbalization of feelings encouraged     Problem: Adult Behavioral Health Plan of Care  Goal: Develops/Participates in Therapeutic [place] to Support Successful Transition  Intervention: Foster Therapeutic [place]  Flowsheets (Taken 10/8/2020 1552)  Trust Relationship/Rapport:   care explained   emotional support provided   empathic listening provided   questions answered   reassurance provided   thoughts/feelings acknowledged     Problem: Adult Behavioral Health Plan of Care  Goal: Develops/Participates in Therapeutic [place] to Support Successful Transition  Intervention: Mutually Develop Transition Plan  Flowsheets (Taken 10/8/2020 1558)  Transition Support: (Patient refuses to discuss any aftercare plans or sign consents) --    Data: Therapist staffed case with Dr Fuentes this date. He stated that since the patient took her medications today he would not be transferring the patient to [facility].    Therapist met with the patient in the day area. At first she is pleasant but quickly become antagonistic. When I asked her about where she is from she responds by saying \"You mean I'm in the hospital and you don't know my address\". She then begins to ramble about a bear that she was given. States that the material the bear is made of came out of her furniture at home. She was also talking about Libertarians and how she doesn't think that THC should be legalized.     Attempted again to have patient sign consents but she refuses. Says she does not trust this therapist and she will call her brother later. She continues to be focused on her bills at home.    Assessment: Patient continues to be " grandiose; bizarre; antagonistic; rambling; talking to self out in the day room. Observed her yelling out for Deann Gramajo. Patient will try to engage in conflict with staff members. Sleep is about 3-4 hours per night.    Plan: Patient will continue hospitalization until further stabilized. Will continue to encourage patient to involve family in her treatment for collateral.

## 2020-10-08 NOTE — PLAN OF CARE
Goal Outcome Evaluation:  Plan of Care Reviewed With: patient  Progress: improving  Outcome Summary: Patients behavior less aggressive towards staff but still needs to be redirected. Did take po meds today. Denies S/I.

## 2020-10-09 PROCEDURE — 99232 SBSQ HOSP IP/OBS MODERATE 35: CPT | Performed by: PSYCHIATRY & NEUROLOGY

## 2020-10-09 RX ADMIN — METOPROLOL SUCCINATE 100 MG: 50 TABLET, FILM COATED, EXTENDED RELEASE ORAL at 09:03

## 2020-10-09 RX ADMIN — DIVALPROEX SODIUM 500 MG: 500 TABLET, DELAYED RELEASE ORAL at 20:30

## 2020-10-09 RX ADMIN — CETIRIZINE HYDROCHLORIDE 10 MG: 10 TABLET, FILM COATED ORAL at 09:03

## 2020-10-09 RX ADMIN — PANTOPRAZOLE SODIUM 40 MG: 40 TABLET, DELAYED RELEASE ORAL at 09:01

## 2020-10-09 RX ADMIN — DIVALPROEX SODIUM 500 MG: 500 TABLET, DELAYED RELEASE ORAL at 09:02

## 2020-10-09 RX ADMIN — QUETIAPINE FUMARATE 100 MG: 100 TABLET ORAL at 20:30

## 2020-10-09 NOTE — PLAN OF CARE
Problem: Adult Behavioral Health Plan of Care  Goal: Patient-Specific Goal (Individualization)  Outcome: Ongoing, Progressing  Goal: Adheres to Safety Considerations for Self and Others  Outcome: Ongoing, Progressing  Goal: Absence of New-Onset Illness or Injury  Outcome: Ongoing, Progressing  Goal: Optimized Coping Skills in Response to Life Stressors  Outcome: Ongoing, Progressing  Goal: Develops/Participates in Therapeutic [place] to Support Successful Transition  Outcome: Ongoing, Progressing     Problem: Fall Injury Risk  Goal: Absence of Fall and Fall-Related Injury  Outcome: Ongoing, Progressing     Problem: Behavior Regulation Impairment (Manic or Hypomanic Signs/Symptoms)  Goal: Improved Impulse Control (Manic/Hypomanic Signs/Symptoms)  Outcome: Ongoing, Progressing     Problem: Cognitive Impairment (Manic or Hypomanic Signs/Symptoms)  Goal: Optimized Cognitive Function (Manic/Hypomanic Signs/Symptoms)  Outcome: Ongoing, Progressing     Problem: Cognitive Impairment (Psychotic Signs/Symptoms)  Goal: Optimal Cognitive Function (Psychotic Signs/Symptoms)  Outcome: Ongoing, Progressing     Problem: Hypertension Comorbidity  Goal: Blood Pressure in Desired Range  Outcome: Ongoing, Progressing   Goal Outcome Evaluation:  Plan of Care Reviewed With: patient  Progress: improving     Alert and oriented to person; reports good appetite and sleep; denies anxiety and depression; denies SI/HI/AVH; demanding, irrational, argumentative with staff; reoriented to place and time; redirected pt multiple times; medications reviewed; pt sleeping throughout the night.

## 2020-10-09 NOTE — PLAN OF CARE
Problem: Adult Behavioral Health Plan of Care  Goal: Optimized Coping Skills in Response to Life Stressors  Outcome: Ongoing, Progressing  Intervention: Promote Effective Coping Strategies  Flowsheets (Taken 10/9/2020 1514)  Supportive Measures:  • active listening utilized  • counseling provided  Goal: Develops/Participates in Therapeutic [place] to Support Successful Transition  Outcome: Ongoing, Progressing  Intervention: Foster Therapeutic [place]  Flowsheets (Taken 10/9/2020 1514)  Trust Relationship/Rapport:  • care explained  • choices provided  • emotional support provided  • empathic listening provided  • thoughts/feelings acknowledged  • reassurance provided  • questions encouraged  • questions answered  Intervention: Mutually Develop Transition Plan  Flowsheets (Taken 10/9/2020 1514)  Transition Support:  • community resources reviewed  • crisis management plan promoted  • crisis management plan verbalized  • follow-up care discussed  • follow-up care coordinated      1515:    DATA:         Therapist met individually with patient this date.  Patient agreeable. Therapist staffed case with Dr. Vyas and RN staff who report that patient continues to be manic with intermittent medication compliance. Can not rule out possible need for involuntary evaluation if patient continues to refuse medications.      Therapist reviewed medical chart since last seeing patient.       Clinical Maneuvering/Intervention:     Therapist assisted patient in processing above session content; acknowledged and normalized patient’s thoughts, feelings, and concerns.  Discussed the therapist/patient relationship and explain the parameters and limitations of relative confidentiality.  Also discussed the importance of active participation, and honesty to the treatment process.  Encouraged the patient to discuss/vent their feelings, frustrations, and fears concerning their ongoing medical issues and validated their feelings.    "  Allowed patient to freely discuss issues without interruption or judgment. Provided safe, confidential environment to facilitate the development of positive therapeutic relationship and encourage open, honest communication.      Therapist addressed discharge safety planning this date. Assisted patient in identifying risk factors which would indicate the need for higher level of care after discharge;  including thoughts to harm self or others and/or self-harming behavior. Encouraged patient to call 911, or present to the nearest emergency room should any of these events occur. Discussed crisis intervention services and means to access.  Encouraged securing any objects of harm.       Encouraged mask wearing, social distancing and regular hand washing during COVID19 risk.      ASSESSMENT:      The patient was seen for follow up this date.  Patient noted to appear psychotic.  Patient observed to be in the day room talking to herself.  Patient reports, \"What's your name? My name is Lashell.\"  Staff report that patient has been antagonistic and irritable.  Patient continues to be manic with behaviors such as singing and dancing around the unit.      PLAN:       Patient to remain hospitalized this date.     Treatment team will focus efforts on stabilizing patient's acute symptoms while providing education on healthy coping and crisis management to reduce hospitalizations.   Patient requires daily psychiatrist evaluation and 24/7 nursing supervision to promote patient  safety.     Therapist will offer 1-4 individual sessions, 1 therapy group daily, family education, and appropriate referral.    Therapist recommends outpatient psych referral.  Patient unable to participate in planning this date. Can not rule out need for involuntary transfer if patient continues to refuse medications. Will continue attempts to obtain patient's consent to contact patient's family for safety planning.   "

## 2020-10-09 NOTE — PROGRESS NOTES
"INPATIENT PSYCHIATRIC PROGRESS NOTE    Name:  Hamida Cazares  :  1950  MRN:  [id number]  Visit Number:  [id number]  Length of stay:  4    Behavioral Health Treatment Plan and Problem List: I have reviewed and approved the Behavioral Health Treatment Plan and Problem list.    SUBJECTIVE    CC/ Focus of exam: Psychosis    Patient's subjective status: \"I'm into philosophy\"    INTERVAL HISTORY:   First time seeing patient.  Chart, vitals, labs and nursing notes reviewed.      Patient pleasant and fairly manic this morning, dancing around the unit, talking about memorizing 412 lines of [employer] for a play she started on when she was 21 years old.  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  Intermittently compliant with medication, so stressed the importance of medicines with her today.  She refused Abilify and Seroquel.    Mood: Elevated, labile  Anxiety: Elevated  Yenifer: 8/10  Sleep: Poor     Review of Systems   Constitutional: Negative.    Respiratory: Negative.    Cardiovascular: Negative.    Gastrointestinal: Negative.    Musculoskeletal: Negative.    Neurological: Negative.    Psychiatric/Behavioral: Positive for confusion, dysphoric mood, hallucinations and sleep disturbance. The patient is nervous/anxious and is hyperactive.          OBJECTIVE    Temp:  [97 °F (36.1 °C)-97.5 °F (36.4 °C)] 97.5 °F (36.4 °C)  Heart Rate:  [76-84] 84  Resp:  [16-18] 16  BP: (128-137)/(86-93) 137/93    MENTAL STATUS EXAM:        Psychomotor: No psychomotor agitation/retardation, No EPS, No motor tics  Speech-normal rate, amount.  Mood/Affect:  Euphoric and Inappropriate  Thought Processes:  tangential and loose associations  Thought Content:  grandiose and bizarre  Hallucination(s): None demonstrated  Hopelessness: No  Optimistic:No  Suicidal Thoughts:   No  Suicidal Plan/Intent:  No  Homicidal Thoughts:  absent  Orientation: oriented x 3  Memory: recent intact    Lab Results (last 24 hours)     ** No " results found for the last 24 hours. **           Imaging Results (Last 24 Hours)     ** No results found for the last 24 hours. **           ECG/EMG Results (most recent)     Procedure Component Value Units Date/Time    ECG 12 Lead [[id number]] Collected: 10/06/20 0318     Updated: 10/06/20 1254    Narrative:      Test Reason : Baseline Cardiac Status  Blood Pressure : **/** mmHG  Vent. Rate : 104 BPM     Atrial Rate : 104 BPM     P-R Int : 102 ms          QRS Dur : 082 ms      QT Int : 346 ms       P-R-T Axes : 040 029 057 degrees     QTc Int : 454 ms    Sinus tachycardia with short TX  Nonspecific ST abnormality  Abnormal ECG  No previous ECGs available  Confirmed by Faheem Valente (2020) on 10/6/2020 12:54:29 PM    Referred By:  CORAL           Confirmed By:Faheem Valente           ALLERGIES: Cefdinir, Codeine, Morphine and related, Sulfa antibiotics, and Lyrica [pregabalin]      Current Facility-Administered Medications:   •  aluminum-magnesium hydroxide-simethicone (MAALOX MAX) 400-400-40 MG/5ML suspension 15 mL, 15 mL, Oral, Q6H PRN, Dez Fuentes MD  •  ARIPiprazole (ABILIFY) tablet 10 mg, 10 mg, Oral, Daily, Adams Fuentes MD  •  atorvastatin (LIPITOR) tablet 40 mg, 40 mg, Oral, Nightly, Adams Fuentes MD  •  benzonatate (TESSALON) capsule 100 mg, 100 mg, Oral, TID PRN, Dez Fuentes MD  •  cetirizine (zyrTEC) tablet 10 mg, 10 mg, Oral, Daily, Dez Fuentes MD, 10 mg at 10/09/20 0903  •  divalproex (DEPAKOTE) DR tablet 500 mg, 500 mg, Oral, BID, Dez Fuentes MD, 500 mg at 10/09/20 0902  •  ibuprofen (ADVIL,MOTRIN) tablet 400 mg, 400 mg, Oral, Q6H PRN, Dez Fuentes MD  •  loperamide (IMODIUM) capsule 2 mg, 2 mg, Oral, Q2H PRN, Dez Fuentes MD  •  LORazepam (ATIVAN) injection 1 mg, 1 mg, Intramuscular, BID PRN, Adams Fuentes MD, 1 mg at 10/07/20 1426  •  magnesium hydroxide (MILK OF MAGNESIA) suspension 2400 mg/10mL 10 mL, 10 mL, Oral, Daily  PRN, Dez Fuentes MD  •  melatonin tablet 3 mg, 3 mg, Oral, Nightly PRN, Dez Fuentes MD  •  metoprolol succinate XL (TOPROL-XL) 24 hr tablet 100 mg, 100 mg, Oral, Daily, Dez Fuentes MD, 100 mg at 10/09/20 0903  •  nicotine (NICODERM CQ) 21 MG/24HR patch 1 patch, 1 patch, Transdermal, Daily, Dez Fuentes MD, 1 patch at 10/06/20 0838  •  OLANZapine zydis (zyPREXA) disintegrating tablet 5 mg, 5 mg, Oral, TID PRN, Adams Fuentes MD, 5 mg at 10/06/20 1248  •  ondansetron (ZOFRAN) tablet 4 mg, 4 mg, Oral, Q6H PRN, Dez Fuentes MD  •  pantoprazole (PROTONIX) EC tablet 40 mg, 40 mg, Oral, Q AM, Adams Fuentes MD, 40 mg at 10/09/20 0901  •  QUEtiapine (SEROquel) tablet 100 mg, 100 mg, Oral, Nightly, Dez Fuentes MD, 100 mg at 10/08/20 2033  •  sodium chloride nasal spray 2 spray, 2 spray, Each Nare, PRANGELIQUE, Dez Fuentes MD  •  ziprasidone (GEODON) 10 mg in sterile water (preservative free) 0.5 mL injection, 10 mg, Intramuscular, Q2H PRN, Adams Fuentes MD, 10 mg at 10/07/20 1425    ASSESSMENT & PLAN    Psychosis (CMS/HCC)  Most certainly either manic phase of bipolar disorder or schizophrenia, more detailed history will make the diagnosis, will initiate treatment with Abilify and Depakote supplemented by PRN Zyprexa and use necessitated by combative behavior IM Geodon/Ativan. Sent for records from [place] and seeking more information from local sources if possible.  Will provide for supportive therapeutic effort.     Patient not compliant with Seroquel or Abilify this morning    Special precautions: Special Precautions Level 3 (q15 min checks)     Behavioral Health Treatment Plan and Problem List: I have reviewed and approved the Behavioral Health Treatment Plan and Problem list.      Clinician:  Stuart Vyas MD  10/09/20  10:18 EDT    Dictated utilizing Dragon dictation

## 2020-10-10 PROCEDURE — 99232 SBSQ HOSP IP/OBS MODERATE 35: CPT | Performed by: PSYCHIATRY & NEUROLOGY

## 2020-10-10 RX ADMIN — DIVALPROEX SODIUM 500 MG: 500 TABLET, DELAYED RELEASE ORAL at 20:41

## 2020-10-10 RX ADMIN — QUETIAPINE FUMARATE 100 MG: 100 TABLET ORAL at 20:41

## 2020-10-10 RX ADMIN — PANTOPRAZOLE SODIUM 40 MG: 40 TABLET, DELAYED RELEASE ORAL at 08:39

## 2020-10-10 RX ADMIN — DIVALPROEX SODIUM 500 MG: 500 TABLET, DELAYED RELEASE ORAL at 08:40

## 2020-10-10 RX ADMIN — METOPROLOL SUCCINATE 100 MG: 50 TABLET, FILM COATED, EXTENDED RELEASE ORAL at 08:40

## 2020-10-10 RX ADMIN — CETIRIZINE HYDROCHLORIDE 10 MG: 10 TABLET, FILM COATED ORAL at 08:40

## 2020-10-10 RX ADMIN — IBUPROFEN 400 MG: 400 TABLET, FILM COATED ORAL at 11:17

## 2020-10-10 NOTE — PROGRESS NOTES
"INPATIENT PSYCHIATRIC PROGRESS NOTE    Name:  Hamida Cazares  :  1950  MRN:  [id number]  Visit Number:  [id number]  Length of stay:  5    SUBJECTIVE  CC/Focus of Exam: psychosis    INTERVAL HISTORY:  The patient wanted to know if they still made Ativan as she thinks it would be a better medication because she became very sick when she stopped Xanax once. She has been intermittently compliant with her medications.    Review of Systems   Constitutional: Negative.    Respiratory: Negative.    Cardiovascular: Negative.    Gastrointestinal: Negative.        OBJECTIVE    Temp:  [97.6 °F (36.4 °C)] 97.6 °F (36.4 °C)  Heart Rate:  [62] 62  Resp:  [20] 20  BP: (136)/(79) 136/79    MENTAL STATUS EXAM:  Appearance:Casually dressed, good hygeine.   Cooperation:Cooperative  Psychomotor: No psychomotor agitation/retardation, No EPS, No motor tics  Speech-normal rate, amount.  Mood \"good\"   Affect-labile  Thought Content-delusional material present  Thought process-disorganized.  Suicidality: No SI  Homicidality: No HI  Perception: No AH/VH      Lab Results (last 24 hours)     ** No results found for the last 24 hours. **             Imaging Results (Last 24 Hours)     ** No results found for the last 24 hours. **             ECG/EMG Results (most recent)     Procedure Component Value Units Date/Time    ECG 12 Lead [[id number]] Collected: 10/06/20 0318     Updated: 10/06/20 1254    Narrative:      Test Reason : Baseline Cardiac Status  Blood Pressure : **/** mmHG  Vent. Rate : 104 BPM     Atrial Rate : 104 BPM     P-R Int : 102 ms          QRS Dur : 082 ms      QT Int : 346 ms       P-R-T Axes : 040 029 057 degrees     QTc Int : 454 ms    Sinus tachycardia with short IA  Nonspecific ST abnormality  Abnormal ECG  No previous ECGs available  Confirmed by Faheem Valente () on 10/6/2020 12:54:29 PM    Referred By:  CORAL           Confirmed By:Faheem Valente           ALLERGIES: Cefdinir, Codeine, Morphine and " related, Sulfa antibiotics, and Lyrica [pregabalin]      Current Facility-Administered Medications:   •  aluminum-magnesium hydroxide-simethicone (MAALOX MAX) 400-400-40 MG/5ML suspension 15 mL, 15 mL, Oral, Q6H PRN, Dez Fuentes MD  •  ARIPiprazole (ABILIFY) tablet 10 mg, 10 mg, Oral, Daily, Adams Fuentes MD  •  atorvastatin (LIPITOR) tablet 40 mg, 40 mg, Oral, Nightly, Adams Fuentes MD  •  benzonatate (TESSALON) capsule 100 mg, 100 mg, Oral, TID PRN, Dez Fuentes MD  •  cetirizine (zyrTEC) tablet 10 mg, 10 mg, Oral, Daily, Dez Fuentes MD, 10 mg at 10/10/20 0840  •  divalproex (DEPAKOTE) DR tablet 500 mg, 500 mg, Oral, BID, Dez Fuentes MD, 500 mg at 10/10/20 0840  •  ibuprofen (ADVIL,MOTRIN) tablet 400 mg, 400 mg, Oral, Q6H PRN, Dez Fuentes MD, 400 mg at 10/10/20 1117  •  loperamide (IMODIUM) capsule 2 mg, 2 mg, Oral, Q2H PRN, Dez Fuentes MD  •  LORazepam (ATIVAN) injection 1 mg, 1 mg, Intramuscular, BID PRN, Adams Fuentes MD, 1 mg at 10/07/20 1426  •  magnesium hydroxide (MILK OF MAGNESIA) suspension 2400 mg/10mL 10 mL, 10 mL, Oral, Daily PRN, Dez Fuentes MD  •  melatonin tablet 3 mg, 3 mg, Oral, Nightly PRN, Dez Fuentes MD  •  metoprolol succinate XL (TOPROL-XL) 24 hr tablet 100 mg, 100 mg, Oral, Daily, Dez Fuentes MD, 100 mg at 10/10/20 0840  •  nicotine (NICODERM CQ) 21 MG/24HR patch 1 patch, 1 patch, Transdermal, Daily, Dez Fuentes MD, 1 patch at 10/06/20 0838  •  OLANZapine zydis (zyPREXA) disintegrating tablet 5 mg, 5 mg, Oral, TID PRN, Adams Fuentes MD, 5 mg at 10/06/20 1248  •  ondansetron (ZOFRAN) tablet 4 mg, 4 mg, Oral, Q6H PRN, Dez Fuentes MD  •  pantoprazole (PROTONIX) EC tablet 40 mg, 40 mg, Oral, Q AM, Adams Fuentes MD, 40 mg at 10/10/20 0839  •  QUEtiapine (SEROquel) tablet 100 mg, 100 mg, Oral, Nightly, Dez Fuentes MD, 100 mg at 10/09/20 2030  •  sodium chloride nasal  spray 2 spray, 2 spray, Each Nare, PRN, Dez Fuentes MD  •  ziprasidone (GEODON) 10 mg in sterile water (preservative free) 0.5 mL injection, 10 mg, Intramuscular, Q2H PRN, Adams Fuentes MD, 10 mg at 10/07/20 1425    ASSESSMENT & PLAN:        Psychosis (CMS/HCC)  - Continue Abilify, Seroquel and Depakote.      Hypertension  - Continue Metoprolol XL      GERD (gastroesophageal reflux disease)  - Continue Protonix      HLD (hyperlipidemia)  - Continue Lipitor      Special precautions: Special Precautions Level 3 (q15 min checks) .    Behavioral Health Treatment Plan and Problem List: I have reviewed and approved the Behavioral Health Treatment Plan and Problem list.  The patient has had a chance to review and agrees with the treatment plan.     Clinician:  Marc Parr MD  10/10/20  13:07 EDT

## 2020-10-10 NOTE — PLAN OF CARE
Goal Outcome Evaluation:  Plan of Care Reviewed With: patient  Progress: no change  Outcome Summary: monitoring blood pressure twice daily and as needed, continues to have psychotic symptoms, thought we put people in a broiler room and she heard them in her room, continues to have some stephanie, gait steady,no changes in treatment this shift.

## 2020-10-10 NOTE — PLAN OF CARE
Goal Outcome Evaluation:  Plan of Care Reviewed With: patient  Progress: no change  Patient has been in and out of room, taking some items into her room, argues with staff, does not answer assessment questions at times.  Appears confused at times. Has been worried about her brother having to evacuate after watching weather channel.  Needs re-directioning from staff.

## 2020-10-11 PROCEDURE — 99232 SBSQ HOSP IP/OBS MODERATE 35: CPT | Performed by: PSYCHIATRY & NEUROLOGY

## 2020-10-11 RX ADMIN — QUETIAPINE FUMARATE 100 MG: 100 TABLET ORAL at 20:18

## 2020-10-11 RX ADMIN — IBUPROFEN 400 MG: 400 TABLET, FILM COATED ORAL at 17:45

## 2020-10-11 RX ADMIN — DIVALPROEX SODIUM 500 MG: 500 TABLET, DELAYED RELEASE ORAL at 20:18

## 2020-10-11 RX ADMIN — METOPROLOL SUCCINATE 100 MG: 50 TABLET, FILM COATED, EXTENDED RELEASE ORAL at 09:21

## 2020-10-11 RX ADMIN — DIVALPROEX SODIUM 500 MG: 500 TABLET, DELAYED RELEASE ORAL at 09:21

## 2020-10-11 RX ADMIN — IBUPROFEN 400 MG: 400 TABLET, FILM COATED ORAL at 09:21

## 2020-10-11 RX ADMIN — PANTOPRAZOLE SODIUM 40 MG: 40 TABLET, DELAYED RELEASE ORAL at 09:22

## 2020-10-11 NOTE — PLAN OF CARE
Goal Outcome Evaluation:  Plan of Care Reviewed With: patient  Progress: no change  Outcome Summary: Pt reports good sleep and poor appetite, denies SI and HI, denies anxiety and depression. Pt is restless, responds to internal stimuli, is demanding and frequently seeks staff.

## 2020-10-11 NOTE — PLAN OF CARE
Goal Outcome Evaluation:  Plan of Care Reviewed With: patient  Progress: no change  Outcome Summary: Monitoring vital signs twice daily and as needed, continues to have psychotic symptoms, unable to sleep tonight up ans down all night, no falls, fall precaution in place. No changes in treatment this shift.

## 2020-10-11 NOTE — PROGRESS NOTES
"INPATIENT PSYCHIATRIC PROGRESS NOTE    Name:  Hamida Cazares  :  1950  MRN:  [id number]  Visit Number:  [id number]  Length of stay:  6    SUBJECTIVE  CC/Focus of Exam: psychosis    INTERVAL HISTORY:  The patient asked again if she could get an Ativan or Xanax. She denies any thoughts of harm to self or others but has been confused and restless at times.    Review of Systems   Constitutional: Negative.    Respiratory: Negative.    Cardiovascular: Negative.    Gastrointestinal: Negative.        OBJECTIVE    Temp:  [97.6 °F (36.4 °C)-97.9 °F (36.6 °C)] 97.6 °F (36.4 °C)  Heart Rate:  [63-68] 63  Resp:  [18] 18  BP: (133-147)/(68-85) 147/85    MENTAL STATUS EXAM:  Appearance:Casually dressed, good hygeine.   Cooperation:Cooperative  Psychomotor: No psychomotor agitation/retardation, No EPS, No motor tics  Speech-normal rate, amount.  Mood \"good\"   Affect-labile  Thought Content-delusional material present  Thought process-disorganized.  Suicidality: No SI  Homicidality: No HI  Perception: No AH/VH      Lab Results (last 24 hours)     ** No results found for the last 24 hours. **             Imaging Results (Last 24 Hours)     ** No results found for the last 24 hours. **             ECG/EMG Results (most recent)     Procedure Component Value Units Date/Time    ECG 12 Lead [[id number]] Collected: 10/06/20 0318     Updated: 10/06/20 1254    Narrative:      Test Reason : Baseline Cardiac Status  Blood Pressure : **/** mmHG  Vent. Rate : 104 BPM     Atrial Rate : 104 BPM     P-R Int : 102 ms          QRS Dur : 082 ms      QT Int : 346 ms       P-R-T Axes : 040 029 057 degrees     QTc Int : 454 ms    Sinus tachycardia with short WA  Nonspecific ST abnormality  Abnormal ECG  No previous ECGs available  Confirmed by Faheem Valente () on 10/6/2020 12:54:29 PM    Referred By:  CORAL           Confirmed By:Faheem Valente           ALLERGIES: Cefdinir, Codeine, Morphine and related, Sulfa antibiotics, and Lyrica " [pregabalin]      Current Facility-Administered Medications:   •  aluminum-magnesium hydroxide-simethicone (MAALOX MAX) 400-400-40 MG/5ML suspension 15 mL, 15 mL, Oral, Q6H PRN, Dez Fuentes MD  •  ARIPiprazole (ABILIFY) tablet 10 mg, 10 mg, Oral, Daily, Adams Fuentes MD  •  atorvastatin (LIPITOR) tablet 40 mg, 40 mg, Oral, Nightly, Adams Fuentes MD  •  benzonatate (TESSALON) capsule 100 mg, 100 mg, Oral, TID PRN, Dez Fuentes MD  •  cetirizine (zyrTEC) tablet 10 mg, 10 mg, Oral, Daily, Dez Fuentes MD, 10 mg at 10/10/20 0840  •  divalproex (DEPAKOTE) DR tablet 500 mg, 500 mg, Oral, BID, Dez Fuentes MD, 500 mg at 10/11/20 0921  •  ibuprofen (ADVIL,MOTRIN) tablet 400 mg, 400 mg, Oral, Q6H PRN, Dez Fuentes MD, 400 mg at 10/11/20 0921  •  loperamide (IMODIUM) capsule 2 mg, 2 mg, Oral, Q2H PRN, Dez Fuentes MD  •  LORazepam (ATIVAN) injection 1 mg, 1 mg, Intramuscular, BID PRN, Adams Fuentes MD, 1 mg at 10/07/20 1426  •  magnesium hydroxide (MILK OF MAGNESIA) suspension 2400 mg/10mL 10 mL, 10 mL, Oral, Daily PRN, Dez Fuentes MD  •  melatonin tablet 3 mg, 3 mg, Oral, Nightly PRN, Dez Fuentes MD  •  metoprolol succinate XL (TOPROL-XL) 24 hr tablet 100 mg, 100 mg, Oral, Daily, Dez Fuentes MD, 100 mg at 10/11/20 0921  •  nicotine (NICODERM CQ) 21 MG/24HR patch 1 patch, 1 patch, Transdermal, Daily, Dez Fuentes MD, 1 patch at 10/06/20 0838  •  OLANZapine zydis (zyPREXA) disintegrating tablet 5 mg, 5 mg, Oral, TID PRN, Adams Fuentes MD, 5 mg at 10/06/20 1248  •  ondansetron (ZOFRAN) tablet 4 mg, 4 mg, Oral, Q6H PRN, Dez Fuentes MD  •  pantoprazole (PROTONIX) EC tablet 40 mg, 40 mg, Oral, Q AM, Adams Fuentes MD, 40 mg at 10/11/20 0922  •  QUEtiapine (SEROquel) tablet 100 mg, 100 mg, Oral, Nightly, Dez Fuentes MD, 100 mg at 10/10/20 2041  •  sodium chloride nasal spray 2 spray, 2 spray, Each Nare, PRN,  Dez Fuentes MD  •  ziprasidone (GEODON) 10 mg in sterile water (preservative free) 0.5 mL injection, 10 mg, Intramuscular, Q2H PRN, Adams Fuentes MD, 10 mg at 10/07/20 1425    ASSESSMENT & PLAN:        Psychosis (CMS/[facility])  - Continue Abilify, Seroquel and Depakote. Encouraged compliance with medications.      Hypertension  - Continue Metoprolol XL      GERD (gastroesophageal reflux disease)  - Continue Protonix      HLD (hyperlipidemia)  - Continue Lipitor      Special precautions: Special Precautions Level 3 (q15 min checks) .    Behavioral Health Treatment Plan and Problem List: I have reviewed and approved the Behavioral Health Treatment Plan and Problem list.  The patient has had a chance to review and agrees with the treatment plan.     Clinician:  Marc Parr MD  10/11/20  16:53 EDT

## 2020-10-12 PROCEDURE — 99232 SBSQ HOSP IP/OBS MODERATE 35: CPT | Performed by: PSYCHIATRY & NEUROLOGY

## 2020-10-12 RX ADMIN — IBUPROFEN 400 MG: 400 TABLET, FILM COATED ORAL at 19:21

## 2020-10-12 RX ADMIN — DIVALPROEX SODIUM 500 MG: 500 TABLET, DELAYED RELEASE ORAL at 20:56

## 2020-10-12 RX ADMIN — ARIPIPRAZOLE 10 MG: 10 TABLET ORAL at 08:33

## 2020-10-12 RX ADMIN — PANTOPRAZOLE SODIUM 40 MG: 40 TABLET, DELAYED RELEASE ORAL at 05:15

## 2020-10-12 RX ADMIN — METOPROLOL SUCCINATE 100 MG: 50 TABLET, FILM COATED, EXTENDED RELEASE ORAL at 08:34

## 2020-10-12 RX ADMIN — QUETIAPINE FUMARATE 100 MG: 100 TABLET ORAL at 20:56

## 2020-10-12 RX ADMIN — IBUPROFEN 400 MG: 400 TABLET, FILM COATED ORAL at 13:14

## 2020-10-12 RX ADMIN — ATORVASTATIN CALCIUM 40 MG: 40 TABLET, FILM COATED ORAL at 20:56

## 2020-10-12 RX ADMIN — CETIRIZINE HYDROCHLORIDE 10 MG: 10 TABLET, FILM COATED ORAL at 08:34

## 2020-10-12 RX ADMIN — DIVALPROEX SODIUM 500 MG: 500 TABLET, DELAYED RELEASE ORAL at 08:33

## 2020-10-12 NOTE — NURSING NOTE
"Patient came out of her room and called me to the side and started whispering to me about an Uber she asked \"do you know what an [employer] is\" told patient yes and explained it was like a taxi, patient then wanted me to whisper and held her finger to her lips and looked around the room. Encouraged patient to go back to bed and get some sleep.   "

## 2020-10-12 NOTE — PLAN OF CARE
Problem: Adult Behavioral Health Plan of Care  Goal: Patient-Specific Goal (Individualization)  10/12/2020 1609 by Maria Ines Schulz RN  Outcome: Ongoing, Progressing  10/12/2020 1607 by Maria Ines Schulz RN  Outcome: Ongoing, Progressing  10/12/2020 1606 by Maria Ines Schulz RN  Outcome: Ongoing, Progressing  Goal: Adheres to Safety Considerations for Self and Others  10/12/2020 1609 by Maria Ines Schulz RN  Outcome: Ongoing, Progressing  10/12/2020 1607 by Maria Ines Schulz RN  Outcome: Ongoing, Progressing  10/12/2020 1606 by Maria Ines Schulz RN  Outcome: Ongoing, Progressing  Goal: Absence of New-Onset Illness or Injury  10/12/2020 1609 by Maria Ines Schulz RN  Outcome: Ongoing, Progressing  10/12/2020 1607 by Maria Ines Schulz RN  Outcome: Ongoing, Progressing  10/12/2020 1606 by Maria Ines Schulz RN  Outcome: Ongoing, Progressing  Goal: Optimized Coping Skills in Response to Life Stressors  10/12/2020 1609 by Maria Ines Schulz RN  Outcome: Ongoing, Progressing  10/12/2020 1607 by Maria Ines Schulz RN  Outcome: Ongoing, Progressing  10/12/2020 1606 by Maria Ines Schulz RN  Outcome: Ongoing, Progressing  Goal: Develops/Participates in Therapeutic [place] to Support Successful Transition  10/12/2020 1609 by Maria Ines Schulz RN  Outcome: Ongoing, Progressing  10/12/2020 1607 by Maria Ines Schulz RN  Outcome: Ongoing, Progressing  10/12/2020 1606 by Maria Ines Schulz RN  Outcome: Ongoing, Progressing  Goal: Plan of Care Review  10/12/2020 1609 by Maria Ines Schulz RN  Outcome: Ongoing, Progressing  Flowsheets  Taken 10/12/2020 1609 by Maria Ines Schulz RN  Patient Agreement with Plan of Care: agrees  Taken 10/12/2020 0703 by Laly Alicia, RN  Plan of Care Reviewed With: patient  Taken 10/11/2020 1145 by Olive Brown RN  Outcome Summary: Pt reports good sleep and poor appetite, denies SI and HI, denies anxiety and depression. Pt is restless,  responds to internal stimuli, is demanding and frequently seeks staff.  10/12/2020 1607 by Maria Ines Schulz RN  Outcome: Ongoing, Progressing  10/12/2020 1606 by Maria Ines Schulz RN  Outcome: Ongoing, Progressing     Problem: Fall Injury Risk  Goal: Absence of Fall and Fall-Related Injury  10/12/2020 1609 by Maria Ines Schulz RN  Outcome: Ongoing, Progressing  10/12/2020 1607 by Maria Ines Schulz RN  Outcome: Ongoing, Progressing  10/12/2020 1606 by Maria Ines Schulz RN  Outcome: Ongoing, Progressing     Problem: Behavior Regulation Impairment (Manic or Hypomanic Signs/Symptoms)  Goal: Improved Impulse Control (Manic/Hypomanic Signs/Symptoms)  10/12/2020 1609 by Maria Ines Schulz RN  Outcome: Ongoing, Progressing  10/12/2020 1607 by Maria Ines Schulz RN  Outcome: Ongoing, Progressing  10/12/2020 1606 by Maria Ines Schulz RN  Outcome: Ongoing, Progressing     Problem: Cognitive Impairment (Manic or Hypomanic Signs/Symptoms)  Goal: Optimized Cognitive Function (Manic/Hypomanic Signs/Symptoms)  10/12/2020 1609 by Maria Ines Schulz RN  Outcome: Ongoing, Progressing  10/12/2020 1607 by Maria Ines Schulz RN  Outcome: Ongoing, Progressing  10/12/2020 1606 by Maria Ines Schulz RN  Outcome: Ongoing, Progressing     Problem: Cognitive Impairment (Psychotic Signs/Symptoms)  Goal: Optimal Cognitive Function (Psychotic Signs/Symptoms)  10/12/2020 1609 by Maria Ines Schulz RN  Outcome: Ongoing, Progressing  10/12/2020 1607 by Maria Ines Schulz RN  Outcome: Ongoing, Progressing  10/12/2020 1606 by Maria Ines Schulz RN  Outcome: Ongoing, Progressing     Problem: Hypertension Comorbidity  Goal: Blood Pressure in Desired Range  10/12/2020 1609 by Maria Ines Schulz RN  Outcome: Ongoing, Progressing  10/12/2020 1607 by Maria Ines Schulz RN  Outcome: Ongoing, Progressing  10/12/2020 1606 by Maria Ines Schulz RN  Outcome: Ongoing, Progressing   Goal Outcome Evaluation:  Plan of Care  Reviewed With: patient  Progress: no change

## 2020-10-12 NOTE — PROGRESS NOTES
"INPATIENT PSYCHIATRIC PROGRESS NOTE    Name:  Hamida Cazares  :  1950  MRN:  [id number]  Visit Number:  [id number]  Length of stay:  7    Behavioral Health Treatment Plan and Problem List: I have reviewed and approved the Behavioral Health Treatment Plan and Problem list.    SUBJECTIVE    CC/ Focus of exam: Bipolar Disorder (provisional)    Patient's subjective status: \"I'm fine\"     INTERVAL HISTORY: Patient very much in tone with cooperating minimally thus avoiding transfer to [facility] due to noncompliance.  Picking choosing on her medications, has not taken the Abilify nor Lipitor for the past several days since she is allergic to both.  We will take lithium because \"it causes tumors\".  Remains delusional and grandiose observed to be experiencing internal stimuli stimulation such as auditory hallucinations.  After session patient did agree to take the Abilify but let it be known she was against any Depo format.  We will need to take this day by day hopefully gaining a little bit of ground with patient's compliance and therapeutic relationship each day.    Depression rating 0/10  Anxiety rating 0/10  Sleep: none         Review of Systems   Respiratory: Negative.    Cardiovascular: Negative.    Gastrointestinal: Negative.    Neurological: Negative.          OBJECTIVE    Temp:  [96.5 °F (35.8 °C)-97 °F (36.1 °C)] 96.5 °F (35.8 °C)  Heart Rate:  [63-66] 66  Resp:  [18-20] 20  BP: (116-127)/(65-68) 127/68    MENTAL STATUS EXAM:        Psychomotor: No psychomotor agitation/retardation, No EPS, No motor tics  Speech-normal rate, amount.  Mood/Affect:  Irritable  Thought Processes:  loose associations  Thought Content:  grandiose, negativistic and bizarre  Hallucination(s): auditory  Hopelessness: No  Optimistic:Yes  Suicidal Thoughts:   No  Suicidal Plan/Intent:  No  Homicidal Thoughts:  absent  Orientation: oriented x 3  Memory: recent intact    Lab Results (last 24 hours)     ** No results found for the last 24 " hours. **           Imaging Results (Last 24 Hours)     ** No results found for the last 24 hours. **           ECG/EMG Results (most recent)     Procedure Component Value Units Date/Time    ECG 12 Lead [[id number]] Collected: 10/06/20 0318     Updated: 10/06/20 1254    Narrative:      Test Reason : Baseline Cardiac Status  Blood Pressure : **/** mmHG  Vent. Rate : 104 BPM     Atrial Rate : 104 BPM     P-R Int : 102 ms          QRS Dur : 082 ms      QT Int : 346 ms       P-R-T Axes : 040 029 057 degrees     QTc Int : 454 ms    Sinus tachycardia with short IA  Nonspecific ST abnormality  Abnormal ECG  No previous ECGs available  Confirmed by Faheem Valente (2020) on 10/6/2020 12:54:29 PM    Referred By:  CORAL           Confirmed By:Faheem Valente           ALLERGIES: Cefdinir, Codeine, Morphine and related, Sulfa antibiotics, and Lyrica [pregabalin]      Current Facility-Administered Medications:   •  aluminum-magnesium hydroxide-simethicone (MAALOX MAX) 400-400-40 MG/5ML suspension 15 mL, 15 mL, Oral, Q6H PRN, Dez Fuentes MD  •  ARIPiprazole (ABILIFY) tablet 10 mg, 10 mg, Oral, Daily, Adams Fuentes MD, 10 mg at 10/12/20 0833  •  atorvastatin (LIPITOR) tablet 40 mg, 40 mg, Oral, Nightly, Adams Fuentes MD  •  benzonatate (TESSALON) capsule 100 mg, 100 mg, Oral, TID PRN, Dez Fuentes MD  •  cetirizine (zyrTEC) tablet 10 mg, 10 mg, Oral, Daily, Dez Fuentes MD, 10 mg at 10/12/20 0834  •  divalproex (DEPAKOTE) DR tablet 500 mg, 500 mg, Oral, BID, Dez Fuentes MD, 500 mg at 10/12/20 0833  •  ibuprofen (ADVIL,MOTRIN) tablet 400 mg, 400 mg, Oral, Q6H PRN, Dez Fuentes MD, 400 mg at 10/11/20 1745  •  loperamide (IMODIUM) capsule 2 mg, 2 mg, Oral, Q2H PRN, Dez Fuentes MD  •  LORazepam (ATIVAN) injection 1 mg, 1 mg, Intramuscular, BID PRN, Adams Fuentes MD, 1 mg at 10/07/20 1426  •  magnesium hydroxide (MILK OF MAGNESIA) suspension 2400 mg/10mL 10 mL,  10 mL, Oral, Daily PRN, Dez Fuentes MD  •  melatonin tablet 3 mg, 3 mg, Oral, Nightly PRN, Dez Fuentes MD  •  metoprolol succinate XL (TOPROL-XL) 24 hr tablet 100 mg, 100 mg, Oral, Daily, Dez Fuentes MD, 100 mg at 10/12/20 0834  •  nicotine (NICODERM CQ) 21 MG/24HR patch 1 patch, 1 patch, Transdermal, Daily, Dez Fuentes MD, 1 patch at 10/06/20 0838  •  OLANZapine zydis (zyPREXA) disintegrating tablet 5 mg, 5 mg, Oral, TID PRN, Adams Fuentes MD, 5 mg at 10/06/20 1248  •  ondansetron (ZOFRAN) tablet 4 mg, 4 mg, Oral, Q6H PRN, Dez Fuentes MD  •  pantoprazole (PROTONIX) EC tablet 40 mg, 40 mg, Oral, Q AM, Adams Fuentes MD, 40 mg at 10/12/20 0515  •  QUEtiapine (SEROquel) tablet 100 mg, 100 mg, Oral, Nightly, Dez Fuentes MD, 100 mg at 10/11/20 2018  •  sodium chloride nasal spray 2 spray, 2 spray, Each Nare, PRN, Dez Fuentes MD  •  ziprasidone (GEODON) 10 mg in sterile water (preservative free) 0.5 mL injection, 10 mg, Intramuscular, Q2H PRN, Adams Fuentes MD, 10 mg at 10/07/20 1425    ASSESSMENT & PLAN    Psychosis (CMS/HCC)  Most certainly either manic phase of bipolar disorder or schizophrenia, more detailed history will make the diagnosis, will initiate treatment with Abilify and Depakote supplemented by PRN Zyprexa and use necessitated by combative behavior IM Geodon/Ativan. Sent for records from [facility] and seeking more information from local sources if possible.  Will provide for supportive therapeutic effort.     Hypertension  metoprolol    GERD  pantoprazole    Hyperlipemia  atorvastatin     Special precautions: Special Precautions Level 3 (q15 min checks)     Behavioral Health Treatment Plan and Problem List: I have reviewed and approved the Behavioral Health Treatment Plan and Problem list.    I spent a total of 26 minutes in direct patient care including  18 minutes face to face with the patient assessment, coordination of care, and  counseling the patient on the current and follow-up treatment plans regarding her status. Answered patient questions regarding the medications.     TOM Fuentes MD    Clinician:  Adams Fuentes MD  10/12/20  10:08 EDT    Dictated utilizing Dragon dictation

## 2020-10-12 NOTE — NURSING NOTE
Client approached desk and paranoid that the abilify she took is making her feet swell. Not client has no swelling. She believes she is going to have a reaction to the medication. Will continue to monitor.

## 2020-10-12 NOTE — PLAN OF CARE
Problem: Adult Behavioral Health Plan of Care  Goal: Patient-Specific Goal (Individualization)  Outcome: Ongoing, Progressing  Flowsheets  Taken 10/6/2020 1542 by Patricia Haynes  Patient-Specific Goals (Include Timeframe): Patient will deny SI/HI prior to discharge. Patient will identify 1 healthy coping skill for depression prior to discharge.  Patient will consent to appropriate aftercare plan prior to discharge.  Taken 10/6/2020 1537 by Patricia Haynes  Patient Personal Strengths: family/social support  Patient Vulnerabilities:   limited social skills   lacks insight into illness   poor impulse control     Problem: Adult Behavioral Health Plan of Care  Goal: Optimized Coping Skills in Response to Life Stressors  Intervention: Promote Effective Coping Strategies  Flowsheets (Taken 10/12/2020 1704)  Supportive Measures:   counseling provided   decision-making supported   relaxation techniques promoted   goal setting facilitated   self-care encouraged   journaling promoted   self-reflection promoted   problem-solving facilitated   self-responsibility promoted   verbalization of feelings encouraged   positive reinforcement provided   active listening utilized     Problem: Adult Behavioral Health Plan of Care  Goal: Develops/Participates in Therapeutic [place] to Support Successful Transition  Intervention: Foster Therapeutic [place]  Flowsheets (Taken 10/12/2020 1704)  Trust Relationship/Rapport:   care explained   choices provided   emotional support provided   empathic listening provided   questions answered   questions encouraged   thoughts/feelings acknowledged   reassurance provided     Problem: Adult Behavioral Health Plan of Care  Goal: Develops/Participates in Therapeutic [place] to Support Successful Transition  Intervention: Mutually Develop Transition Plan  Flowsheets (Taken 10/12/2020 1704)  Transition Support:   community resources reviewed   crisis management plan promoted    follow-up care discussed     DATA:         Therapist met individually with patient this date to continue efforts to obtain consent for family involvement.  Clinical Maneuvering/Intervention:     Therapist assisted patient in processing above session content; acknowledged and normalized patient’s thoughts, feelings, and concerns.   Encouraged the patient to discuss/vent their feelings, frustrations, and fears concerning their ongoing medical issues and validated their feelings.  Discussed the importance of finding enjoyable activities and coping skills that the patient can engage in a regular basis. Discussed healthy coping skills such as distraction, self love, grounding, thought challenges/reframing, etc.  Allowed patient to freely discuss issues without interruption or judgment. Provided safe, confidential environment to facilitate the development of positive therapeutic relationship and encourage open, honest communication.      ASSESSMENT:      Patient reported that she is her chosen one.  She reports she does not have any family she has anything to do with and reports she only has a friend who is very wealthy and there is another chosen one.  Patient discussed concerns with contacting at a financial company that she pays this monthly.  She also discussed how people in her apartment complex only want anything to do with her when she has money.  She discussed having a photographic memory.  She requested this therapist went over the visitation inpatient rules with her.  She reported requests at this therapist speak low so that the other people on the unit could not hear this therapist while patient was asking questions.  Patient appears to be paranoid and delusional at this time.  Patient does not appear stable for discharge.  Patient unable to provide today contact for family involvement today.  When discussing healthy coping patient was able to identify that she enjoys reading and talking with her friend.      PLAN:       Patient to remain hospitalized this date.     Treatment team will focus efforts on stabilizing patient's acute symptoms while providing education on healthy coping and crisis management to reduce hospitalizations.   Patient requires daily psychiatrist evaluation and 24/7 nursing supervision to promote patient  safety.     Therapist will offer 1-4 individual sessions, 1 therapy group daily, family education, and appropriate referral.    Patient to continue stabilization.  Efforts will continue to engage in safe disposition.

## 2020-10-12 NOTE — NURSING NOTE
"Pt refused Lipitor @2100, stating that she \"is allergic to it.\" Adv pt she has been taking Lipitor before and explained what it's for, and that we don't show an allergy to Lipitor in her records. Pt insists that she is allergic and that we need to get it added to her records.   "

## 2020-10-13 LAB — VALPROATE SERPL-MCNC: 98 MCG/ML (ref 50–125)

## 2020-10-13 PROCEDURE — 99232 SBSQ HOSP IP/OBS MODERATE 35: CPT | Performed by: PSYCHIATRY & NEUROLOGY

## 2020-10-13 PROCEDURE — 80164 ASSAY DIPROPYLACETIC ACD TOT: CPT | Performed by: PSYCHIATRY & NEUROLOGY

## 2020-10-13 RX ORDER — OLANZAPINE 5 MG/1
5 TABLET, ORALLY DISINTEGRATING ORAL DAILY
Status: DISCONTINUED | OUTPATIENT
Start: 2020-10-13 | End: 2020-10-14

## 2020-10-13 RX ADMIN — METOPROLOL SUCCINATE 100 MG: 50 TABLET, FILM COATED, EXTENDED RELEASE ORAL at 08:11

## 2020-10-13 RX ADMIN — DIVALPROEX SODIUM 500 MG: 500 TABLET, DELAYED RELEASE ORAL at 08:12

## 2020-10-13 RX ADMIN — IBUPROFEN 400 MG: 400 TABLET, FILM COATED ORAL at 16:08

## 2020-10-13 RX ADMIN — IBUPROFEN 400 MG: 400 TABLET, FILM COATED ORAL at 06:31

## 2020-10-13 RX ADMIN — IBUPROFEN 400 MG: 400 TABLET, FILM COATED ORAL at 21:52

## 2020-10-13 RX ADMIN — DIVALPROEX SODIUM 500 MG: 500 TABLET, DELAYED RELEASE ORAL at 20:19

## 2020-10-13 RX ADMIN — OLANZAPINE 5 MG: 5 TABLET, ORALLY DISINTEGRATING ORAL at 11:00

## 2020-10-13 RX ADMIN — PANTOPRAZOLE SODIUM 40 MG: 40 TABLET, DELAYED RELEASE ORAL at 05:16

## 2020-10-13 RX ADMIN — QUETIAPINE FUMARATE 100 MG: 100 TABLET ORAL at 20:19

## 2020-10-13 NOTE — DISCHARGE INSTR - APPOINTMENTS
GISELLE Saldana   [address]  [place] [address]  [phone]    October 21 2020 at 12:00pm

## 2020-10-13 NOTE — PROGRESS NOTES
"   This patient visit is electronic.  The patient gave consent to be seen remotely, and when consent is given they understand that the consent allows for patient identifiable information to be sent to a third party as needed.   They may refuse to be seen remotely at any time. The electronic data is encrypted and password protected, and the patient has been advised of the potential risks to privacy not withstanding such measures.    INPATIENT PSYCHIATRIC PROGRESS NOTE    Name:  Hamida Cazares  :  1950  MRN:  [id number]  Visit Number:  [id number]  Length of stay:  8    Behavioral Health Treatment Plan and Problem List: I have reviewed and approved the Behavioral Health Treatment Plan and Problem list.    SUBJECTIVE    CC/ Focus of exam: Bipolar Disorder     Patient's subjective status: \"I'm fine\"     INTERVAL HISTORY: Patient remains delusional with both grandiose and paranoia ideation with a lack of insight and opposition to taking medications that are indicated aside from the Depakote and low-dose Seroquel.  Unfortunately it is unlikely that we will make any therapeutic headway with this impasse.  With the implication that we are at an impasse that will necessitate transferring the patient to [facility] the patient has elected agreed to take an alternative to Abilify such as Zyprexa thereby  extending patient's hospital stay yet another day.   Therapist to contact the patient's family to explain the therapeutic dilemma.      Depression rating 0/10  Anxiety rating 0/10  Sleep: 1 to 2 hours       ROS:  Cardiac negative  Respiratory negative  Neurological negative  GI negative      OBJECTIVE    Temp:  [97.5 °F (36.4 °C)-98.1 °F (36.7 °C)] 97.5 °F (36.4 °C)  Heart Rate:  [59-68] 64  Resp:  [18] 18  BP: (116-134)/(57-76) 134/76    MENTAL STATUS EXAM:        Psychomotor: No psychomotor agitation/retardation, No EPS, No motor tics  Speech-normal rate, amount.  Mood/Affect:  Inappropriate  Thought Processes:  loose " associations  Thought Content:  dilussional, paranoid and grandiose  Hallucination(s): Observed by nursing to apparently being reacting to internal stimulus such as auditory hallucinations  Hopelessness: No  Optimistic:Yes but based on her lack of insight and delusional thought content  Suicidal Thoughts:   No  Suicidal Plan/Intent:  No  Homicidal Thoughts:  absent  Orientation: oriented x 3  Memory: recent intact    Lab Results (last 24 hours)     ** No results found for the last 24 hours. **           Imaging Results (Last 24 Hours)     ** No results found for the last 24 hours. **           ECG/EMG Results (most recent)     Procedure Component Value Units Date/Time    ECG 12 Lead [[id number]] Collected: 10/06/20 0318     Updated: 10/06/20 1254    Narrative:      Test Reason : Baseline Cardiac Status  Blood Pressure : **/** mmHG  Vent. Rate : 104 BPM     Atrial Rate : 104 BPM     P-R Int : 102 ms          QRS Dur : 082 ms      QT Int : 346 ms       P-R-T Axes : 040 029 057 degrees     QTc Int : 454 ms    Sinus tachycardia with short MN  Nonspecific ST abnormality  Abnormal ECG  No previous ECGs available  Confirmed by Faheem Valente (2020) on 10/6/2020 12:54:29 PM    Referred By:  CORAL           Confirmed By:Faheem Valente           ALLERGIES: Cefdinir, Codeine, Morphine and related, Sulfa antibiotics, and Lyrica [pregabalin]      Current Facility-Administered Medications:   •  aluminum-magnesium hydroxide-simethicone (MAALOX MAX) 400-400-40 MG/5ML suspension 15 mL, 15 mL, Oral, Q6H PRN, Dez Fuentes MD  •  ARIPiprazole (ABILIFY) tablet 10 mg, 10 mg, Oral, Daily, Adams Fuentes MD, 10 mg at 10/12/20 0833  •  atorvastatin (LIPITOR) tablet 40 mg, 40 mg, Oral, Nightly, Adams Fuentes MD, 40 mg at 10/12/20 2056  •  benzonatate (TESSALON) capsule 100 mg, 100 mg, Oral, TID PRN, Dez Fuentes MD  •  cetirizine (zyrTEC) tablet 10 mg, 10 mg, Oral, Daily, Dez Fuentes MD, 10 mg  at 10/12/20 0834  •  divalproex (DEPAKOTE) DR tablet 500 mg, 500 mg, Oral, BID, Dez Fuentes MD, 500 mg at 10/13/20 0812  •  ibuprofen (ADVIL,MOTRIN) tablet 400 mg, 400 mg, Oral, Q6H PRN, Dez Fuentes MD, 400 mg at 10/13/20 0631  •  loperamide (IMODIUM) capsule 2 mg, 2 mg, Oral, Q2H PRN, Dez Fuentes MD  •  LORazepam (ATIVAN) injection 1 mg, 1 mg, Intramuscular, BID PRN, Adams Fuentes MD, 1 mg at 10/07/20 1426  •  magnesium hydroxide (MILK OF MAGNESIA) suspension 2400 mg/10mL 10 mL, 10 mL, Oral, Daily PRN, Dez Fuentes MD  •  melatonin tablet 3 mg, 3 mg, Oral, Nightly PRN, Dez Fuentes MD  •  metoprolol succinate XL (TOPROL-XL) 24 hr tablet 100 mg, 100 mg, Oral, Daily, Dez Fuentes MD, 100 mg at 10/13/20 0811  •  nicotine (NICODERM CQ) 21 MG/24HR patch 1 patch, 1 patch, Transdermal, Daily, Dez Fuentes MD, 1 patch at 10/06/20 0838  •  OLANZapine zydis (zyPREXA) disintegrating tablet 5 mg, 5 mg, Oral, TID PRN, Adams Fuentes MD, 5 mg at 10/06/20 1248  •  ondansetron (ZOFRAN) tablet 4 mg, 4 mg, Oral, Q6H PRN, Dez Fuentes MD  •  pantoprazole (PROTONIX) EC tablet 40 mg, 40 mg, Oral, Q AM, Adams Fuentes MD, 40 mg at 10/13/20 0516  •  QUEtiapine (SEROquel) tablet 100 mg, 100 mg, Oral, Nightly, Dez Fuentes MD, 100 mg at 10/12/20 2056  •  sodium chloride nasal spray 2 spray, 2 spray, Each Nare, PRN, Dez Fuentes MD  •  ziprasidone (GEODON) 10 mg in sterile water (preservative free) 0.5 mL injection, 10 mg, Intramuscular, Q2H PRN, Adams Fuentes MD, 10 mg at 10/07/20 1425    ASSESSMENT & PLAN    Psychosis (CMS/[facility])  See HPI     Hypertension  metoprolol     GERD  pantoprazole     Hyperlipemia  atorvastatin       Special precautions: Special Precautions Level 3 (q15 min checks)     Behavioral Health Treatment Plan and Problem List: I have reviewed and approved the Behavioral Health Treatment Plan and Problem list.    I spent a  total of 30 minutes in direct patient care including 20 minutes face to face with the patient assessment, coordination of care, and counseling the patient on the current and follow-up treatment plans regarding patient's status. Answered patient questions regarding treatment options..    TOM Fuentes MD    Clinician:  Adams Fuentes MD  10/13/20  10:15 EDT    Dictated utilizing Dragon dictation

## 2020-10-13 NOTE — PROGRESS NOTES
1350:       Therapist met 1-1 with patient at bedside.  Again asked to sign consent for [facility].  Patient was provided with a consent and was allowed time to read it completely.  She finally signed for [facility].  Therapist asked naviglucien Hardin to make aftercare appointment this date.     Navigator called [facility] and was informed that patient is a client of their ACT team.  They recommended we talk to [facility] this date.  Left message at [phone] for call back.      Called [facility] and talked with Carmela. She reports that patient was referred by [facility]. Carmela had one visit with the patient. She would not comply and refused services. Carmela plans to close patient's chart so that she can be seen with traditional [facility] services.

## 2020-10-13 NOTE — PLAN OF CARE
Goal Outcome Evaluation:  Plan of Care Reviewed With: patient  Progress: no change  Outcome Summary: Vital signs twice daily and as needed, continues to have psychotic symptoms, responds to internal stimuli, keeps thinking she hears someone calling her name, stephanie has improved some, still unable to sleep good but reports that she does, no falls, fall precautions in place, no changes in treatment this shift.

## 2020-10-13 NOTE — PLAN OF CARE
Problem: Adult Behavioral Health Plan of Care  Goal: Optimized Coping Skills in Response to Life Stressors  Intervention: Promote Effective Coping Strategies  Flowsheets (Taken 10/12/2020 1704 by Olimpia Rivero LCSW)  Supportive Measures:  • counseling provided  • decision-making supported  • relaxation techniques promoted  • goal setting facilitated  • self-care encouraged  • journaling promoted  • self-reflection promoted  • problem-solving facilitated  • self-responsibility promoted  • verbalization of feelings encouraged  • positive reinforcement provided  • active listening utilized  Goal: Develops/Participates in Therapeutic [place] to Support Successful Transition  Intervention: Foster Therapeutic [place]  Flowsheets (Taken 10/12/2020 1704 by Olimpia Rivero LCSW)  Trust Relationship/Rapport:  • care explained  • choices provided  • emotional support provided  • empathic listening provided  • questions answered  • questions encouraged  • thoughts/feelings acknowledged  • reassurance provided  Intervention: Mutually Develop Transition Plan  Flowsheets (Taken 10/12/2020 1704 by Olimpia Rivero LCSW)  Transition Support:  • community resources reviewed  • crisis management plan promoted  • follow-up care discussed      1010:     DATA:      Therapist reviewed medical chart since last seeing patient.     Therapist attempted to speak with patient about involving family for safety planning and the importance of aftercare planning.  Made several attempts to get patient to sign consent for [facility] or [facility].  Patient refused to sign consents and stated that she needed to talk to her doctor Tiffanie before agreeing to anything. Therapist contacted patient's next of kin which is identified to be her brother Librado Pinedo at [phone]; no answer x 3 this date.     Therapist met individually with patient this date. Patient agreeable.  Therapist present during Dr. Fuentes  evaluation and staffed case with him this date.  He placed order for therapist to contact patient's family for collateral and safety planning this date.  At this point, treatment team feels that contact with next of kin is justified due to patient's mental status. We also  discussed patient's intermittent refusal of medications and that we may have to transfer patient for involuntary evaluation if she continues to refuse.         Clinical Maneuvering/Intervention:     Therapist assisted patient in processing above session content; acknowledged and normalized patient’s thoughts, feelings, and concerns.  Discussed the therapist/patient relationship and explain the parameters and limitations of relative confidentiality.  Also discussed the importance of active participation, and honesty to the treatment process.  Encouraged the patient to discuss/vent their feelings, frustrations, and fears concerning their ongoing medical issues and validated their feelings.    Allowed patient to freely discuss issues without interruption or judgment. Provided safe, confidential environment to facilitate the development of positive therapeutic relationship and encourage open, honest communication.      Therapist addressed discharge safety planning this date. Assisted patient in identifying risk factors which would indicate the need for higher level of care after discharge;  including thoughts to harm self or others and/or self-harming behavior. Encouraged patient to call 911, or present to the nearest emergency room should any of these events occur. Discussed crisis intervention services and means to access.  Encouraged securing any objects of harm.       Therapist encouraged mask wearing at all times and for patient to practice social distancing and regular hand washing due to COVID19 risk.     ASSESSMENT:      The patient was seen for follow up this date.  Patient seen in the office 1-1.  Patient also seen with Alfredo Cavazos  date.  Treatment team attempted to educate patient about need for medication.  Patient appears grandiose this date.  Refusing multiple medications stating that she is allergic to most medications.  Patient hesitated, but agreed to take Zyprexa. Patient continues to be delusional and paranoid stating that she lives in the [place] and that the mob is involved. She has been observed by RN staff responding to internal stimuli. Patient minimally cooperative, tends to go on tangents about neighbors who are stealing from her.  Patient is able to list a healthy coping skill today. She reports that reading her bible and prayer are healthy coping skills.      PLAN:       Patient to remain hospitalized this date.     Treatment team will focus efforts on stabilizing patient's acute symptoms while providing education on healthy coping and crisis management to reduce hospitalizations.   Patient requires daily psychiatrist evaluation and 24/7 nursing supervision to promote patient  safety.     Therapist will offer 1-4 individual sessions, 1 therapy group daily, family education, and appropriate referral.    Therapist recommends outpatient psych referral.  Patient refuses to sign consent this date. Aftercare unresolved.  Therapist will continue attempts to reach patient's brother Librado to discuss treatment and aftercare planning. Can not rule out need for involuntary evaluation.

## 2020-10-14 PROCEDURE — 99232 SBSQ HOSP IP/OBS MODERATE 35: CPT | Performed by: PSYCHIATRY & NEUROLOGY

## 2020-10-14 RX ORDER — OLANZAPINE 5 MG/1
10 TABLET, ORALLY DISINTEGRATING ORAL DAILY
Status: DISCONTINUED | OUTPATIENT
Start: 2020-10-15 | End: 2020-10-16 | Stop reason: HOSPADM

## 2020-10-14 RX ADMIN — METOPROLOL SUCCINATE 100 MG: 50 TABLET, FILM COATED, EXTENDED RELEASE ORAL at 08:33

## 2020-10-14 RX ADMIN — DIVALPROEX SODIUM 500 MG: 500 TABLET, DELAYED RELEASE ORAL at 08:34

## 2020-10-14 RX ADMIN — DIVALPROEX SODIUM 500 MG: 500 TABLET, DELAYED RELEASE ORAL at 20:22

## 2020-10-14 RX ADMIN — IBUPROFEN 400 MG: 400 TABLET, FILM COATED ORAL at 18:19

## 2020-10-14 RX ADMIN — PANTOPRAZOLE SODIUM 40 MG: 40 TABLET, DELAYED RELEASE ORAL at 05:34

## 2020-10-14 RX ADMIN — QUETIAPINE FUMARATE 100 MG: 100 TABLET ORAL at 20:22

## 2020-10-14 RX ADMIN — OLANZAPINE 5 MG: 5 TABLET, ORALLY DISINTEGRATING ORAL at 08:34

## 2020-10-14 RX ADMIN — IBUPROFEN 400 MG: 400 TABLET, FILM COATED ORAL at 11:29

## 2020-10-14 RX ADMIN — CETIRIZINE HYDROCHLORIDE 10 MG: 10 TABLET, FILM COATED ORAL at 08:33

## 2020-10-14 NOTE — PROGRESS NOTES
"INPATIENT PSYCHIATRIC PROGRESS NOTE    Name:  Hamida Cazares  :  1950  MRN:  [id number]  Visit Number:  [id number]  Length of stay:  9    Behavioral Health Treatment Plan and Problem List: I have reviewed and approved the Behavioral Health Treatment Plan and Problem list.    SUBJECTIVE    CC/ Focus of exam: Bipolar disorder    Patient's subjective status: \"I am fine\"    INTERVAL HISTORY: Patient apparently has decided to be more cooperative, did take her Zyprexa dose this morning without objection.  Patient remains delusional coupled with lack of insight, she seems to think her 1 day of pleasant behavior deserves discharged citing necessity of paying her bills.  Patient was seen with her therapist who will help her contact the vendor she is concerned about..  Most certainly patient not stabilized at this time plus the overlapping concern of noncompliance post discharge being almost certain.  Patient rejecting depo medication format.     See therapist note.    Depression rating 0/10  Anxiety rating 0/10  Sleep: Patient did not sleep well last night       Review of Systems   Respiratory: Negative.    Cardiovascular: Negative.    Neurological: Negative.          OBJECTIVE    Temp:  [96.8 °F (36 °C)-98 °F (36.7 °C)] 96.8 °F (36 °C)  Heart Rate:  [57-67] 62  Resp:  [16-18] 16  BP: (115-141)/(60-75) 141/60    MENTAL STATUS EXAM:        Psychomotor: No psychomotor agitation/retardation, No EPS, No motor tics  Speech-normal rate, amount.  Mood/Affect:  Inappropriate  Thought Processes:  loose associations  Thought Content:  dilussional  Hallucination(s): none  Hopelessness: No  Optimistic:Yes  Suicidal Thoughts:   No  Suicidal Plan/Intent:  No  Homicidal Thoughts:  absent  Orientation: oriented x 3  Memory: Immediate, recent, recent remote, remote intact    Lab Results (last 24 hours)     ** No results found for the last 24 hours. **           Imaging Results (Last 24 Hours)     ** No results found for the last 24 " hours. **           ECG/EMG Results (most recent)     Procedure Component Value Units Date/Time    ECG 12 Lead [[id number]] Collected: 10/06/20 0318     Updated: 10/06/20 1254    Narrative:      Test Reason : Baseline Cardiac Status  Blood Pressure : **/** mmHG  Vent. Rate : 104 BPM     Atrial Rate : 104 BPM     P-R Int : 102 ms          QRS Dur : 082 ms      QT Int : 346 ms       P-R-T Axes : 040 029 057 degrees     QTc Int : 454 ms    Sinus tachycardia with short NC  Nonspecific ST abnormality  Abnormal ECG  No previous ECGs available  Confirmed by Faheem Valente (2020) on 10/6/2020 12:54:29 PM    Referred By:  CORAL           Confirmed By:Faheem Valente           ALLERGIES: Cefdinir, Codeine, Morphine and related, Sulfa antibiotics, and Lyrica [pregabalin]      Current Facility-Administered Medications:   •  aluminum-magnesium hydroxide-simethicone (MAALOX MAX) 400-400-40 MG/5ML suspension 15 mL, 15 mL, Oral, Q6H PRN, Dez Fuentes MD  •  atorvastatin (LIPITOR) tablet 40 mg, 40 mg, Oral, Nightly, Adams Fuentes MD, 40 mg at 10/12/20 2056  •  benzonatate (TESSALON) capsule 100 mg, 100 mg, Oral, TID PRN, Dez Fuentes MD  •  cetirizine (zyrTEC) tablet 10 mg, 10 mg, Oral, Daily, Dez Fuentes MD, 10 mg at 10/14/20 0833  •  divalproex (DEPAKOTE) DR tablet 500 mg, 500 mg, Oral, BID, Dez Fuentes MD, 500 mg at 10/14/20 0834  •  ibuprofen (ADVIL,MOTRIN) tablet 400 mg, 400 mg, Oral, Q6H PRN, Dez Fuentes MD, 400 mg at 10/13/20 2152  •  loperamide (IMODIUM) capsule 2 mg, 2 mg, Oral, Q2H PRN, Dez Fuentes MD  •  LORazepam (ATIVAN) injection 1 mg, 1 mg, Intramuscular, BID PRN, Adams Fuentes MD, 1 mg at 10/07/20 1426  •  magnesium hydroxide (MILK OF MAGNESIA) suspension 2400 mg/10mL 10 mL, 10 mL, Oral, Daily PRN, Dez Fuentes MD  •  melatonin tablet 3 mg, 3 mg, Oral, Nightly PRN, Dez Fuentes MD  •  metoprolol succinate XL (TOPROL-XL) 24 hr tablet 100 mg,  100 mg, Oral, Daily, Dez Fuentes MD, 100 mg at 10/14/20 0833  •  nicotine (NICODERM CQ) 21 MG/24HR patch 1 patch, 1 patch, Transdermal, Daily, Dez Fuentes MD, 1 patch at 10/06/20 0838  •  OLANZapine zydis (zyPREXA) disintegrating tablet 5 mg, 5 mg, Oral, TID PRN, Adams Fuentes MD, 5 mg at 10/06/20 1248  •  OLANZapine zydis (zyPREXA) disintegrating tablet 5 mg, 5 mg, Oral, Daily, Adams Fuentes MD, 5 mg at 10/14/20 0834  •  ondansetron (ZOFRAN) tablet 4 mg, 4 mg, Oral, Q6H PRN, Dez Fuentes MD  •  pantoprazole (PROTONIX) EC tablet 40 mg, 40 mg, Oral, Q AM, Adams Fuentes MD, 40 mg at 10/14/20 0534  •  QUEtiapine (SEROquel) tablet 100 mg, 100 mg, Oral, Nightly, Dez Fuentes MD, 100 mg at 10/13/20 2019  •  sodium chloride nasal spray 2 spray, 2 spray, Each Nare, PRN, Dez Fuentes MD  •  ziprasidone (GEODON) 10 mg in sterile water (preservative free) 0.5 mL injection, 10 mg, Intramuscular, Q2H PRN, Adams Fuentes MD, 10 mg at 10/07/20 1425    ASSESSMENT & PLAN    Psychosis (CMS/[facility])  See HPI     Hypertension  metoprolol     GERD  pantoprazole     Hyperlipemia  atorvastatin       Special precautions: Special Precautions Level 3 (q15 min checks)     Behavioral Health Treatment Plan and Problem List: I have reviewed and approved the Behavioral Health Treatment Plan and Problem list.    I spent a total of 25 minutes in direct patient care including  18 minutes face to face with the patient assessment, coordination of care, and counseling the patient on the current and follow-up treatment plans regarding her status. Answered patient questions regarding LOS..    C. Tadeo Fuentes MD    Clinician:  Adams Fuentes MD  10/14/20  10:55 EDT    Dictated utilizing Dragon dictation

## 2020-10-14 NOTE — PLAN OF CARE
Goal Outcome Evaluation:  Plan of Care Reviewed With: patient  Progress: improving  Outcome Summary: Pt denies SI, HI, and A/V/H. Although, pt does display paranoia toward peers and staff and appears to respond to internal stimuli at times. Pt is cooperative and compliant with medications. Pt reports no problems with sleep or appetite Pt is labile in mood throughout shift. Pt was educated on social distancing, wearing a mask, and hand washing. Pt denies any acute complications. Will continue to montior pt.

## 2020-10-14 NOTE — PLAN OF CARE
Problem: Adult Behavioral Health Plan of Care  Goal: Optimized Coping Skills in Response to Life Stressors  Intervention: Promote Effective Coping Strategies  Flowsheets (Taken 10/13/2020 1940 by Ana Maria Espinosa RN)  Supportive Measures:   active listening utilized   verbalization of feelings encouraged  Goal: Develops/Participates in Therapeutic [place] to Support Successful Transition  Intervention: Foster Therapeutic [place]  Flowsheets (Taken 10/14/2020 1101)  Trust Relationship/Rapport:   care explained   choices provided   emotional support provided   empathic listening provided   questions answered   thoughts/feelings acknowledged   reassurance provided   questions encouraged  Intervention: Mutually Develop Transition Plan  Flowsheets (Taken 10/14/2020 1101)  Transition Support:   community resources reviewed   crisis management plan promoted   crisis management plan verbalized   follow-up care coordinated   follow-up care discussed     1045:      DATA:      Therapist reviewed medical chart since last seeing patient.      Therapist met individually with patient this date. Patient agreeable.  Therapist present during Dr. Fuentes evaluation and staffed case with him this date.  Therapist provided her with numbers for [employer], [employer], and [facility] [employer].  Therapist offered several times to take patient to a private office so that we could call these places and let them know she is in a hospital and can not pay her bills until discharge.  Patient refused, took the contacts and stated that she wanted to call on her own.      Therapist contacted brother Librado at [phone]; he remains supportive and encouraging. Reports belief that patient read bad things about medicine and had stopped taking it. This likely made her paranoid about the medicine. Librado reported being relieved that the patient is now taking medicine.  He does report that he is supportive of patient being transferred to  involuntary hospitalization if she begins to refuse medications again. He reports that he Is supportive of patient returning home to her own apartment. We did discuss future options if patient continues to be non-compliant. Educated Librado about the process of obtaining emergency guardianship.  Librado verbalized understanding and wants patient to consider assisted living in the future. Though, patient currently refuses.         Clinical Maneuvering/Intervention:     Therapist assisted patient in processing above session content; acknowledged and normalized patient’s thoughts, feelings, and concerns.  Discussed the therapist/patient relationship and explain the parameters and limitations of relative confidentiality.  Also discussed the importance of active participation, and honesty to the treatment process.  Encouraged the patient to discuss/vent their feelings, frustrations, and fears concerning their ongoing medical issues and validated their feelings.     Allowed patient to freely discuss issues without interruption or judgment. Provided safe, confidential environment to facilitate the development of positive therapeutic relationship and encourage open, honest communication.      Therapist addressed discharge safety planning this date. Assisted patient in identifying risk factors which would indicate the need for higher level of care after discharge;  including thoughts to harm self or others and/or self-harming behavior. Encouraged patient to call 911, or present to the nearest emergency room should any of these events occur. Discussed crisis intervention services and means to access.  Encouraged securing any objects of harm.       Therapist encouraged mask wearing at all times and for patient to practice social distancing and regular hand washing due to COVID19 risk.      ASSESSMENT:      The patient was seen for follow up this date.  Patient seen in the office 1-1.  Patient also seen with Alfredo Cavazos this date.   Patient improving some in terms of being more cooperative and complying with medications.  However, patient continues to be grandiose. For example, she reports that she needs to leave to pay several bills. We encouraged her to allow therapist to help her call these places to let them know she was in the hospital.  Patient will not agree to that.  She asked for numbers and stated that she would call on her own and would not allow therapist to help. Patient denies SI/HI.  Continues to struggle with insight regarding her illness and possibly at risk for decompensation.  Refuses to allow [facility] case management to help her. Patient denies SI/HI. Thought content continues to be delusional, but patient is more redirectable. Patient denies anxiety/depression symptoms.  Isolates to her room, but overall more pleasant today.      PLAN:       Patient to remain hospitalized this date.      Treatment team will focus efforts on stabilizing patient's acute symptoms while providing education on healthy coping and crisis management to reduce hospitalizations.   Patient requires daily psychiatrist evaluation and 24/7 nursing supervision to promote patient  safety.     Therapist will offer 1-4 individual sessions, 1 therapy group daily, family education, and appropriate referral.     Therapist recommends outpatient psych referral.  Patient has signed consent for [facility].  She refuses case management.  She plans to return home for self care.

## 2020-10-14 NOTE — PLAN OF CARE
Goal Outcome Evaluation:  Plan of Care Reviewed With: patient  Progress: no change  Outcome Summary: Patient calm and cooperative. Patient continues to have psychotic symptoms and responds to internal stimuli. Patient hasn't slept well tonight.

## 2020-10-15 PROCEDURE — 99232 SBSQ HOSP IP/OBS MODERATE 35: CPT | Performed by: PSYCHIATRY & NEUROLOGY

## 2020-10-15 RX ORDER — QUETIAPINE FUMARATE 300 MG/1
150 TABLET, FILM COATED ORAL NIGHTLY
Status: DISCONTINUED | OUTPATIENT
Start: 2020-10-15 | End: 2020-10-16 | Stop reason: HOSPADM

## 2020-10-15 RX ADMIN — METOPROLOL SUCCINATE 100 MG: 50 TABLET, FILM COATED, EXTENDED RELEASE ORAL at 09:07

## 2020-10-15 RX ADMIN — OLANZAPINE 10 MG: 5 TABLET, ORALLY DISINTEGRATING ORAL at 09:07

## 2020-10-15 RX ADMIN — DIVALPROEX SODIUM 500 MG: 500 TABLET, DELAYED RELEASE ORAL at 20:43

## 2020-10-15 RX ADMIN — DIVALPROEX SODIUM 500 MG: 500 TABLET, DELAYED RELEASE ORAL at 09:07

## 2020-10-15 RX ADMIN — ATORVASTATIN CALCIUM 40 MG: 40 TABLET, FILM COATED ORAL at 20:43

## 2020-10-15 RX ADMIN — IBUPROFEN 400 MG: 400 TABLET, FILM COATED ORAL at 14:10

## 2020-10-15 RX ADMIN — PANTOPRAZOLE SODIUM 40 MG: 40 TABLET, DELAYED RELEASE ORAL at 07:40

## 2020-10-15 RX ADMIN — IBUPROFEN 400 MG: 400 TABLET, FILM COATED ORAL at 20:48

## 2020-10-15 RX ADMIN — CETIRIZINE HYDROCHLORIDE 10 MG: 10 TABLET, FILM COATED ORAL at 09:07

## 2020-10-15 RX ADMIN — QUETIAPINE FUMARATE 150 MG: 300 TABLET, FILM COATED ORAL at 20:43

## 2020-10-15 NOTE — PLAN OF CARE
Goal Outcome Evaluation:  Plan of Care Reviewed With: patient  Progress: improving  Outcome Summary: Patient has been out of room most of the day, cooperative with staff and has taken a few naps.-

## 2020-10-15 NOTE — PLAN OF CARE
Problem: Adult Behavioral Health Plan of Care  Goal: Optimized Coping Skills in Response to Life Stressors  Outcome: Ongoing, Progressing  Intervention: Promote Effective Coping Strategies  Flowsheets (Taken 10/15/2020 1511)  Supportive Measures:   active listening utilized   counseling provided  Goal: Develops/Participates in Therapeutic [place] to Support Successful Transition  Outcome: Ongoing, Progressing  Intervention: Foster Therapeutic [place]  Flowsheets (Taken 10/15/2020 1511)  Trust Relationship/Rapport:   care explained   choices provided   emotional support provided   empathic listening provided   questions answered   questions encouraged   reassurance provided   thoughts/feelings acknowledged  Intervention: Mutually Develop Transition Plan  Flowsheets (Taken 10/14/2020 1101)  Transition Support:   community resources reviewed   crisis management plan promoted   crisis management plan verbalized   follow-up care coordinated   follow-up care discussed      1350:     DATA:      Therapist reviewed medical chart since last seeing patient.      Therapist met individually with patient this date. Patient agreeable.       Therapist contacted brother Librado at [phone]; he remains supportive and aware that patient is persistent about returning home tomorrow and to self care. He is agreeable and thanked therapist for her service.       Clinical Maneuvering/Intervention:     Therapist assisted patient in processing above session content; acknowledged and normalized patient’s thoughts, feelings, and concerns.  Discussed the therapist/patient relationship and explain the parameters and limitations of relative confidentiality.  Also discussed the importance of active participation, and honesty to the treatment process.  Encouraged the patient to discuss/vent their feelings, frustrations, and fears concerning their ongoing medical issues and validated their feelings.     Allowed patient to freely discuss issues  without interruption or judgment. Provided safe, confidential environment to facilitate the development of positive therapeutic relationship and encourage open, honest communication.      Therapist addressed discharge safety planning this date. Assisted patient in identifying risk factors which would indicate the need for higher level of care after discharge;  including thoughts to harm self or others and/or self-harming behavior. Encouraged patient to call 911, or present to the nearest emergency room should any of these events occur. Discussed crisis intervention services and means to access.  Encouraged securing any objects of harm.       Therapist encouraged mask wearing at all times and for patient to practice social distancing and regular hand washing due to COVID19 risk.      ASSESSMENT:      The patient was seen for follow up this date.  Patient seen in the day room 1-1.  Patient calm and cooperative. She continues to be grandiose, but pleasant.  Patient denies SI/HI and depression/anxiety.  Very persistent about returning home tomorrow. Patient's prognosis is certainly guarded due to her lack of insight and history of non-compliance with outpatient services and medications.     PLAN:       Patient to remain hospitalized this date.      Treatment team will focus efforts on stabilizing patient's acute symptoms while providing education on healthy coping and crisis management to reduce hospitalizations.   Patient requires daily psychiatrist evaluation and 24/7 nursing supervision to promote patient  safety.     Therapist will offer 1-4 individual sessions, 1 therapy group daily, family education, and appropriate referral.     Therapist recommends outpatient psych referral.  Patient has signed consent for [facility].  She refuses case management. She has been discharged from [facility].  She plans to return home for self care. She will need [facility] transport.

## 2020-10-15 NOTE — PLAN OF CARE
Goal Outcome Evaluation:  Plan of Care Reviewed With: patient  Progress: improving  Outcome Summary: Patient calm and cooperative this shift. Patient continues to deny AVH, however, appears to respond to internal stimuli at times. Patient's mood continues to be labile. Patient was educated on social distancing, wearing a mask, and proper hand washing. Patient had difficulty staying asleep throughout the night.

## 2020-10-15 NOTE — PROGRESS NOTES
"INPATIENT PSYCHIATRIC PROGRESS NOTE    Name:  Hamida Cazares  :  1950  MRN:  [id number]  Visit Number:  [id number]  Length of stay:  10    Behavioral Health Treatment Plan and Problem List: I have reviewed and approved the Behavioral Health Treatment Plan and Problem list.    SUBJECTIVE    CC/ Focus of exam: Bipolar Disorder (could be Schizophrenia, paranoid type).     Patient's subjective status: \"I'm doing fine\".     INTERVAL HISTORY: remains psychotic with grandiose delusions (best [place] in her [employer] but asked to leave, writes religous songs, attached by another [employer] member). Paranoia theme apparent about her home situation. Can come across as pleasant and cooperative but underlying thought disorder and lack of insight will me effective outpatient treatment difficult.   Patient OK with the current medications.   Depression rating 0/10  Anxiety rating 0/10  Sleep: Patient reports slept through the night, nursing has her sleeping from 10 PM to 2 AM, when presented with the contradiction patient says the night nurse \"is mean and has it in for me\". Goes on about mean things the nurses are doing to her and others.    Seems to have a tendency to make damaging accusations about others.     Review of Systems   Respiratory: Negative.    Cardiovascular: Negative.    Neurological: Negative.          OBJECTIVE    Temp:  [97.5 °F (36.4 °C)-98.7 °F (37.1 °C)] 97.5 °F (36.4 °C)  Heart Rate:  [60-81] 60  Resp:  [16-18] 16  BP: (115-120)/(66-79) 120/79    MENTAL STATUS EXAM:        Psychomotor: No psychomotor agitation/retardation, No EPS, No motor tics  Speech-normal rate, amount.  Mood/Affect:  Appropriate  Thought Processes:  circumstantial  Thought Content:  grandiose  Hallucination(s): none  Hopelessness: Yes  Optimistic:No  Suicidal Thoughts:   No  Suicidal Plan/Intent:  No  Homicidal Thoughts:  absent  Orientation: oriented x 3  Memory: Immediate, recent, recent remote, remote intact    Lab Results (last 24 " hours)     ** No results found for the last 24 hours. **           Imaging Results (Last 24 Hours)     ** No results found for the last 24 hours. **           ECG/EMG Results (most recent)     Procedure Component Value Units Date/Time    ECG 12 Lead [[id number]] Collected: 10/06/20 0318     Updated: 10/06/20 1254    Narrative:      Test Reason : Baseline Cardiac Status  Blood Pressure : **/** mmHG  Vent. Rate : 104 BPM     Atrial Rate : 104 BPM     P-R Int : 102 ms          QRS Dur : 082 ms      QT Int : 346 ms       P-R-T Axes : 040 029 057 degrees     QTc Int : 454 ms    Sinus tachycardia with short VT  Nonspecific ST abnormality  Abnormal ECG  No previous ECGs available  Confirmed by Faheem Valente (2020) on 10/6/2020 12:54:29 PM    Referred By:  CORAL           Confirmed By:Faheem Valente           ALLERGIES: Cefdinir, Codeine, Morphine and related, Sulfa antibiotics, and Lyrica [pregabalin]      Current Facility-Administered Medications:   •  aluminum-magnesium hydroxide-simethicone (MAALOX MAX) 400-400-40 MG/5ML suspension 15 mL, 15 mL, Oral, Q6H PRN, Dez Fuentes MD  •  atorvastatin (LIPITOR) tablet 40 mg, 40 mg, Oral, Nightly, Adams Fuentes MD, 40 mg at 10/12/20 2056  •  benzonatate (TESSALON) capsule 100 mg, 100 mg, Oral, TID PRN, Dez Fuentes MD  •  cetirizine (zyrTEC) tablet 10 mg, 10 mg, Oral, Daily, Dez Fuentes MD, 10 mg at 10/15/20 0907  •  divalproex (DEPAKOTE) DR tablet 500 mg, 500 mg, Oral, BID, Dez Fuentes MD, 500 mg at 10/15/20 0907  •  ibuprofen (ADVIL,MOTRIN) tablet 400 mg, 400 mg, Oral, Q6H PRN, Dez Fuenets MD, 400 mg at 10/14/20 1819  •  loperamide (IMODIUM) capsule 2 mg, 2 mg, Oral, Q2H PRN, Dez Fuentes MD  •  LORazepam (ATIVAN) injection 1 mg, 1 mg, Intramuscular, BID PRN, Adams Fuentes MD, 1 mg at 10/07/20 1426  •  magnesium hydroxide (MILK OF MAGNESIA) suspension 2400 mg/10mL 10 mL, 10 mL, Oral, Daily PRN, Dez Fuentes  MD  •  melatonin tablet 3 mg, 3 mg, Oral, Nightly PRN, Dez Fuentes MD  •  metoprolol succinate XL (TOPROL-XL) 24 hr tablet 100 mg, 100 mg, Oral, Daily, Dez Fuentes MD, 100 mg at 10/15/20 0907  •  nicotine (NICODERM CQ) 21 MG/24HR patch 1 patch, 1 patch, Transdermal, Daily, Dez Fuentes MD, 1 patch at 10/06/20 0838  •  OLANZapine zydis (zyPREXA) disintegrating tablet 10 mg, 10 mg, Oral, Daily, Adams Fuentes MD, 10 mg at 10/15/20 0907  •  OLANZapine zydis (zyPREXA) disintegrating tablet 5 mg, 5 mg, Oral, TID PRN, Adams Fuentes MD, 5 mg at 10/06/20 1248  •  ondansetron (ZOFRAN) tablet 4 mg, 4 mg, Oral, Q6H PRN, Dez Fuentes MD  •  pantoprazole (PROTONIX) EC tablet 40 mg, 40 mg, Oral, Q AM, Adams Fuentes MD, 40 mg at 10/15/20 0740  •  QUEtiapine (SEROquel) tablet 100 mg, 100 mg, Oral, Nightly, Dez Fuentes MD, 100 mg at 10/14/20 2022  •  sodium chloride nasal spray 2 spray, 2 spray, Each Nare, PRN, Dez Fuentes MD  •  ziprasidone (GEODON) 10 mg in sterile water (preservative free) 0.5 mL injection, 10 mg, Intramuscular, Q2H PRN, Adams Fuentes MD, 10 mg at 10/07/20 1425    ASSESSMENT & PLAN  Adams Fuentes MD   Physician   Psychiatry   Progress Notes   Signed   Date of Service:  10/13/20 1015   Creation Time:  10/13/20 101            Signed                This patient visit is electronic.  The patient gave consent to be seen remotely, and when consent is given they understand that the consent allows for patient identifiable information to be sent to a third party as needed.   They may refuse to be seen remotely at any time. The electronic data is encrypted and password protected, and the patient has been advised of the potential risks to privacy not withstanding such measures.     INPATIENT PSYCHIATRIC PROGRESS NOTE     Name:  Hamida Cazares  :  1950  MRN:  [id number]  Visit Number:  [id number]  Length of stay:   "8     Behavioral Health Treatment Plan and Problem List: I have reviewed and approved the Behavioral Health Treatment Plan and Problem list.     SUBJECTIVE     CC/ Focus of exam: Bipolar Disorder      Patient's subjective status: \"I'm fine\"      INTERVAL HISTORY: Patient remains delusional with both grandiose and paranoia ideation with a lack of insight and opposition to taking medications that are indicated aside from the Depakote and low-dose Seroquel.  Unfortunately it is unlikely that we will make any therapeutic headway with this impasse.  With the implication that we are at an impasse that will necessitate transferring the patient to [facility] the patient has elected agreed to take an alternative to Abilify such as Zyprexa thereby  extending patient's hospital stay yet another day.   Therapist to contact the patient's family to explain the therapeutic dilemma.        Depression rating 0/10  Anxiety rating 0/10  Sleep: 1 to 2 hours        ROS:  Cardiac negative  Respiratory negative  Neurological negative  GI negative        OBJECTIVE     Temp:  [97.5 °F (36.4 °C)-98.1 °F (36.7 °C)] 97.5 °F (36.4 °C)  Heart Rate:  [59-68] 64  Resp:  [18] 18  BP: (116-134)/(57-76) 134/76     MENTAL STATUS EXAM:           Psychomotor: No psychomotor agitation/retardation, No EPS, No motor tics  Speech-normal rate, amount.  Mood/Affect:  Inappropriate  Thought Processes:  loose associations  Thought Content:  dilussional, paranoid and grandiose  Hallucination(s): Observed by nursing to apparently being reacting to internal stimulus such as auditory hallucinations  Hopelessness: No  Optimistic:Yes but based on her lack of insight and delusional thought content  Suicidal Thoughts:   No  Suicidal Plan/Intent:  No  Homicidal Thoughts:  absent  Orientation: oriented x 3  Memory: recent intact         Lab Results (last 24 hours)      ** No results found for the last 24 hours. **                            Imaging Results (Last 24 Hours)      " ** No results found for the last 24 hours. **                      ECG/EMG Results (most recent)      Procedure Component Value Units Date/Time     ECG 12 Lead [[id number]] Collected: 10/06/20 0318       Updated: 10/06/20 1254     Narrative:       Test Reason : Baseline Cardiac Status  Blood Pressure : **/** mmHG  Vent. Rate : 104 BPM     Atrial Rate : 104 BPM     P-R Int : 102 ms          QRS Dur : 082 ms      QT Int : 346 ms       P-R-T Axes : 040 029 057 degrees     QTc Int : 454 ms     Sinus tachycardia with short NH  Nonspecific ST abnormality  Abnormal ECG  No previous ECGs available  Confirmed by Faheem Valente (2020) on 10/6/2020 12:54:29 PM     Referred By:  CORAL           Confirmed By:Faheem Valente             ALLERGIES: Cefdinir, Codeine, Morphine and related, Sulfa antibiotics, and Lyrica [pregabalin]        Current Facility-Administered Medications:   •  aluminum-magnesium hydroxide-simethicone (MAALOX MAX) 400-400-40 MG/5ML suspension 15 mL, 15 mL, Oral, Q6H PRN, Dez Fuentes MD  •  ARIPiprazole (ABILIFY) tablet 10 mg, 10 mg, Oral, Daily, Adams Fuentes MD, 10 mg at 10/12/20 0833  •  atorvastatin (LIPITOR) tablet 40 mg, 40 mg, Oral, Nightly, Adams Fuentes MD, 40 mg at 10/12/20 2056  •  benzonatate (TESSALON) capsule 100 mg, 100 mg, Oral, TID PRN, Dez Fuentes MD  •  cetirizine (zyrTEC) tablet 10 mg, 10 mg, Oral, Daily, Dez Fuentes MD, 10 mg at 10/12/20 0834  •  divalproex (DEPAKOTE) DR tablet 500 mg, 500 mg, Oral, BID, Dez Fuentes MD, 500 mg at 10/13/20 0812  •  ibuprofen (ADVIL,MOTRIN) tablet 400 mg, 400 mg, Oral, Q6H PRN, Dez Fuentes MD, 400 mg at 10/13/20 0631  •  loperamide (IMODIUM) capsule 2 mg, 2 mg, Oral, Q2H PRN, Dez Fuentes MD  •  LORazepam (ATIVAN) injection 1 mg, 1 mg, Intramuscular, BID PRN, Adams Fuentes MD, 1 mg at 10/07/20 1426  •  magnesium hydroxide (MILK OF MAGNESIA) suspension 2400 mg/10mL 10 mL, 10 mL,  Oral, Daily PRN, Dez Fuentes MD  •  melatonin tablet 3 mg, 3 mg, Oral, Nightly PRN, Dez Fuentes MD  •  metoprolol succinate XL (TOPROL-XL) 24 hr tablet 100 mg, 100 mg, Oral, Daily, Dez Fuentes MD, 100 mg at 10/13/20 0811  •  nicotine (NICODERM CQ) 21 MG/24HR patch 1 patch, 1 patch, Transdermal, Daily, Dez uFentes MD, 1 patch at 10/06/20 0838  •  OLANZapine zydis (zyPREXA) disintegrating tablet 5 mg, 5 mg, Oral, TID PRN, Adams Fuentes MD, 5 mg at 10/06/20 1248  •  ondansetron (ZOFRAN) tablet 4 mg, 4 mg, Oral, Q6H PRN, Dez Fuentes MD  •  pantoprazole (PROTONIX) EC tablet 40 mg, 40 mg, Oral, Q AM, Adams Fuentes MD, 40 mg at 10/13/20 0516  •  QUEtiapine (SEROquel) tablet 100 mg, 100 mg, Oral, Nightly, eDz Fuentes MD, 100 mg at 10/12/20 2056  •  sodium chloride nasal spray 2 spray, 2 spray, Each Nare, PRN, Dez Fuentes MD  •  ziprasidone (GEODON) 10 mg in sterile water (preservative free) 0.5 mL injection, 10 mg, Intramuscular, Q2H PRN, Adams Fuentes MD, 10 mg at 10/07/20 1425     ASSESSMENT & PLAN     Psychosis (CMS/HCC)  See HPI     Hypertension  metoprolol     GERD  pantoprazole     Hyperlipemia  atorvastatin         Special precautions: Special Precautions Level 3 (q15 min checks)      Behavioral Health Treatment Plan and Problem List: I have reviewed and approved the Behavioral Health Treatment Plan and Problem list.     I spent a total of 30 minutes in direct patient care including 20 minutes face to face with the patient assessment, coordination of care, and counseling the patient on the current and follow-up treatment plans regarding patient's status. Answered patient questions regarding treatment options..     TOM Fuentes MD     Clinician:  Adams Fuentes MD  10/13/20  10:15 EDT     Dictated utilizing Dragon dictation                         Special precautions: Special Precautions Level 3 (q15 min checks)      Behavioral Health Treatment Plan and Problem List: I have reviewed and approved the Behavioral Health Treatment Plan and Problem list.    I spent a total of 30 minutes in direct patient care including  20 minutes face to face with the patient assessment, coordination of care, and counseling the patient on the current and follow-up treatment plans regarding her status.  Patient had no additional questions today .    TOM Fuentes MD    Clinician:  Adams Fuentes MD  10/15/20  09:46 EDT    Dictated utilizing Dragon dictation

## 2020-10-16 VITALS
RESPIRATION RATE: 18 BRPM | HEART RATE: 62 BPM | DIASTOLIC BLOOD PRESSURE: 82 MMHG | WEIGHT: 126.8 LBS | BODY MASS INDEX: 21.13 KG/M2 | SYSTOLIC BLOOD PRESSURE: 125 MMHG | OXYGEN SATURATION: 95 % | HEIGHT: 65 IN | TEMPERATURE: 97.2 F

## 2020-10-16 PROBLEM — F31.60 BIPOLAR AFFECTIVE DISORDER, MIXED (HCC): Status: ACTIVE | Noted: 2017-10-17

## 2020-10-16 PROCEDURE — 99239 HOSP IP/OBS DSCHRG MGMT >30: CPT | Performed by: PSYCHIATRY & NEUROLOGY

## 2020-10-16 RX ORDER — QUETIAPINE FUMARATE 100 MG/1
TABLET, FILM COATED ORAL
Qty: 60 TABLET | Refills: 0 | Status: SHIPPED | OUTPATIENT
Start: 2020-10-16

## 2020-10-16 RX ORDER — LANOLIN ALCOHOL/MO/W.PET/CERES
3 CREAM (GRAM) TOPICAL NIGHTLY PRN
Qty: 30 TABLET | Refills: 0 | Status: SHIPPED | OUTPATIENT
Start: 2020-10-16

## 2020-10-16 RX ORDER — OLANZAPINE 10 MG/1
10 TABLET ORAL NIGHTLY
Qty: 30 TABLET | Refills: 2 | Status: SHIPPED | OUTPATIENT
Start: 2020-10-16 | End: 2021-10-16

## 2020-10-16 RX ORDER — ATORVASTATIN CALCIUM 40 MG/1
40 TABLET, FILM COATED ORAL NIGHTLY
Qty: 30 TABLET | Refills: 0 | Status: SHIPPED | OUTPATIENT
Start: 2020-10-16

## 2020-10-16 RX ORDER — DIVALPROEX SODIUM 500 MG/1
500 TABLET, DELAYED RELEASE ORAL 2 TIMES DAILY
Qty: 60 TABLET | Refills: 2 | Status: SHIPPED | OUTPATIENT
Start: 2020-10-16

## 2020-10-16 RX ORDER — PANTOPRAZOLE SODIUM 40 MG/1
40 TABLET, DELAYED RELEASE ORAL DAILY
Qty: 30 TABLET | Refills: 0 | Status: SHIPPED | OUTPATIENT
Start: 2020-10-16

## 2020-10-16 RX ADMIN — PANTOPRAZOLE SODIUM 40 MG: 40 TABLET, DELAYED RELEASE ORAL at 06:33

## 2020-10-16 RX ADMIN — OLANZAPINE 10 MG: 5 TABLET, ORALLY DISINTEGRATING ORAL at 08:28

## 2020-10-16 RX ADMIN — DIVALPROEX SODIUM 500 MG: 500 TABLET, DELAYED RELEASE ORAL at 08:23

## 2020-10-16 RX ADMIN — CETIRIZINE HYDROCHLORIDE 10 MG: 10 TABLET, FILM COATED ORAL at 08:23

## 2020-10-16 RX ADMIN — IBUPROFEN 400 MG: 400 TABLET, FILM COATED ORAL at 11:22

## 2020-10-16 RX ADMIN — METOPROLOL SUCCINATE 100 MG: 50 TABLET, FILM COATED, EXTENDED RELEASE ORAL at 08:23

## 2020-10-16 NOTE — PLAN OF CARE
"Goal Outcome Evaluation:  Plan of Care Reviewed With: patient  Progress: improving  Outcome Summary: Patient irritable at times; pt states that she is supposed to discharge in the morning; her stated mood is, \"I feel good, really\"; rates anxiety as 4 and depression as 3; will continue to monitor patient  "

## 2020-10-16 NOTE — PROGRESS NOTES
RTEC: Patient is being discharged on this day.    RTEC scheduled for 12:30pm . Patients Melatonin sent to [facility] due to our facility not having it in stock. All other medication co-pays tcp'd due to patient not having money.

## 2020-10-16 NOTE — PROGRESS NOTES
Patient has been scheduled the following appointment:     GISELLE [place]   [address] [place] [address]  [phone]    October 21 2020 at 12:00pm

## 2020-10-16 NOTE — DISCHARGE SUMMARY
Date of Discharge:  10/16/2020    Discharge Diagnosis:Principal Problem:    Bipolar affective disorder, mixed (CMS/HCC)  Active Problems:    GERD (gastroesophageal reflux disease)    HLD (hyperlipidemia)    Hypertension        Presenting Problem/History of Present Illness:Patient was admitted to the hospital on October 5 having presented psychotic, voluntarily admitted for safety evaluation and treatment, see admission note for details.         Hospital Course:  Patient was admitted for safety and stabilization and was placed on standard precautions.  Routine labs were checked.  Patient was assigned a masters level therapist and provided with an opportunity to participate in group and individual therapy on the unit.  Patient seen on a daily basis for evaluation and supportive therapy.  Patient's initial days in the hospital noted with her   irritability and delusional thought content, this all resolved gradually as patient subsequently agreed to taking medication that included Depakote, Seroquel and Zyprexa.  At discharge patient was free of any demonstrated psychotic thought content, her affect was pleasant and she was stating her appreciation for treatment received as well as the current medication format.  Patient did not have any thoughts of harming herself or others at discharge.  Patient brother was informed patient status periodically during her hospitalization.  She is to be followed at the [place] office of the [facility] agency, see below for medications.  Consults:   Consults     No orders found from 9/6/2020 to 10/6/2020.          Labs:  Lab Results (all)     Procedure Component Value Units Date/Time    Valproic Acid Level, Total [[id number]]  (Normal) Collected: 10/13/20 0948    Specimen: Blood Updated: 10/13/20 1054     Valproic Acid 98.0 mcg/mL           Imaging:  Imaging Results (All)     None          Condition on Discharge:  improved    Prognosis: Guarded given her history of noncompliance with  outpatient treatment.    Vital Signs  Temp:  [97.2 °F (36.2 °C)-97.8 °F (36.6 °C)] 97.2 °F (36.2 °C)  Heart Rate:  [62-69] 62  Resp:  [18] 18  BP: (125-135)/(82-89) 125/82           Discharge Medications      ASK your doctor about these medications      Instructions Start Date   divalproex 500 MG DR tablet  Commonly known as: DEPAKOTE   500 mg, Oral, 2 Times Daily      loratadine 10 MG tablet  Commonly known as: CLARITIN   10 mg, Oral, Daily      metoprolol succinate  MG 24 hr tablet  Commonly known as: TOPROL-XL   100 mg, Oral, Daily      QUEtiapine 100 MG tablet  Commonly known as: SEROquel  Ask about: Which instructions should I use?   100 mg, Oral, Nightly             Discharge Diet: Regular    Activity at Discharge: No restrictions    Follow-up Appointments: Follow-up at the [facility] clinic next week.          Adams Fuentes MD  10/16/20  10:08 EDT  Time spent with the discharge process >30 minutes.     Dictated utilizing Dragon dictation

## 2020-10-16 NOTE — NURSING NOTE
Patient educated on the importance of handwashing, daily hygiene, social distancing, and not touching face. Patient encouraged to wear mask.

## 2020-10-16 NOTE — PROGRESS NOTES
"                                   Behavioral Health Discharge Summary             Please fax within 24 hours of discharge to [facility] at: [phone]      Member Name: Hamida Cazares Member ID: [id number]   Authorization Number: [id number] Phone: [phone]   Member Address:  [address] [place] [address]   Discharge Date: 10/16/2020 Level of Care at Discharge: OUTPATIENT   Facility: DISCHARGING FROM [facility] Staff Completing Form:   IRMA ANDERSON RN, U.R.   If the member is being discharged directly to a residential or extended care program, please specify the type below.   __Private Child-Caring Facility (PCC) Residential/Group Home   __Private Child-Caring Facility (PCC) Therapeutic Foster Care   __Residential Treatment Facility (RTF)   __Psychiatric Residential Treatment Facility (PRTF I or II)   __Long-Term Acute Inpatient Hospital Services or Extended Care Unit (ECU)   __Other (please specify):    Brief discharge summary of treatment received (for follow up by the case management team): D/C clinical with list of medications and follow up appts given to patient upon discharge.     BRIEF SUMMARY OF RECOMMENDATIONS FOR ONGOING TREATMENT     Discharged to where: HOME   Discharge diagnoses:    Axis I: \"Bipolar affective disorder, mixed\" PER DISCHARGE SUMMARY (CLOSEST CODE FOUND: F31.60)   Axis II:    Axis III:    Axis IV:    Axis V:    Does the member understand his/her DX?  Yes        PSYCH  Medication     Dose     Schedule Supply/  Quantity  Given at Discharge RX Provided  Yes/No  If Rx Provided, Quantity RX Prior Auth Required  Yes/No Prior Auth  Completed   DEPAKOTE  MG TWICE DAILY        MELATONIN 3MG AT NIGHT AS NEEDED        ZYPREXA 10MG EVERY NIGHT        SEROQUEL 200MG AT BEDTIME                                                          Does the member understand the reason for taking these medications? Yes                                                           FOLLOW-UP " APPOINTMENTS   Please schedule within 7 days of discharge and provide appointment details for all referred services.    PCP/Other Providers Involved in Treatment:    Appointment Type:       OUTPATIENT Provider Name:     NEAL DESAI Provider Phone:      [phone]  Appointment Date:     10/21/2020 Appointment Time:       12PM     Assessment   (new to OP services)        Case Management    Is the member already enrolled in case management?  Yes/No  If yes, date the CM was notified:    If no, was the CM referral offered?  Yes/No  Accepted? Yes/No    Is the Release of Information in the chart? Yes/No:      Medication Management (for member discharged with psychiatric medications):      A&D Treatment (for member with substance abuse/   dependence in the past year):      Medical Condition (for member with a medical condition):    Other recommended treatment:    Do you have any concerns about the discharge plan?  No    If yes, explain:    Was the member involved in the discharge planning?  Yes    If no, explain:    Was a copy of the discharge plan provided to the member?  Yes    If no, explain:

## 2020-10-29 ENCOUNTER — NURSE TRIAGE (OUTPATIENT)
Dept: CALL CENTER | Facility: HOSPITAL | Age: 70
End: 2020-10-29

## 2020-10-29 NOTE — TELEPHONE ENCOUNTER
Reviewed guideline with caller, advises she be seen within 24 hours. Caller agrees to follow care advice.    Reason for Disposition  • [1] MODERATE leg swelling (e.g., swelling extends up to knees) AND [2] new onset or worsening    Additional Information  • Negative: Chest pain  • Negative: Followed an ankle injury  • Negative: Ankle pain is main symptom  • Swelling of both ankles (i.e., pedal edema)  • Negative: Severe difficulty breathing (e.g., struggling for each breath, speaks in single words)  • Negative: Looks like a broken bone or dislocated joint (e.g., crooked or deformed)  • Negative: Sounds like a life-threatening emergency to the triager  • Negative: Chest pain  • Negative: Followed a leg injury  • Negative: [1] Small area of swelling AND [2] followed an insect bite to the area  • Negative: Swelling of one ankle joint  • Negative: Swelling of knee is main symptom  • Negative: Pregnant  • Negative: Postpartum (from 0 to 6 weeks after delivery)  • Negative: Difficulty breathing at rest  • Negative: Entire foot is cool or blue in comparison to other side  • Negative: [1] Can't walk or can barely walk AND [2] new onset  • Negative: [1] Difficulty breathing with exertion (e.g., walking) AND [2] new onset or worsening  • Negative: [1] Red area or streak AND [2] fever  • Negative: [1] Swelling is painful to touch AND [2] fever  • Negative: [1] Cast on leg or ankle AND [2] now increased pain  • Negative: Patient sounds very sick or weak to the triager  • Negative: SEVERE leg swelling (e.g., swelling extends above knee, entire leg is swollen, weeping fluid)  • Negative: [1] Red area or streak [2] large (> 2 in. or 5 cm)  • Negative: [1] Thigh or calf pain AND [2] only 1 side AND [3] present > 1 hour  • Negative: [1] Thigh, calf, or ankle swelling AND [2] only 1 side  • Negative: [1] Thigh, calf, or ankle swelling AND [2] bilateral AND [3] 1 side is more swollen    Answer Assessment - Initial Assessment  "Questions  1. LOCATION: \"Which joint is swollen?\"      Both feet are swollen  2. ONSET: \"When did the swelling start?\"      1 month ago   3. SIZE: \"How large is the swelling?\"      Swollen about an inch   4. PAIN: \"Is there any pain?\" If so, ask: \"How bad is it?\" (Scale 1-10; or mild, moderate, severe)      Yes, The veins are painful   5. CAUSE: \"What do you think caused the swollen joint?\"      unknown  6. OTHER SYMPTOMS: \"Do you have any other symptoms?\" (e.g., fever, chest pain, difficulty breathing, calf pain)      Redness above the ankle  7. PREGNANCY: \"Is there any chance you are pregnant?\" \"When was your last menstrual period?\"      na    Answer Assessment - Initial Assessment Questions  1. ONSET: \"When did the swelling start?\" (e.g., minutes, hours, days)      1 month ago   2. LOCATION: \"What part of the leg is swollen?\"  \"Are both legs swollen or just one leg?\"      Both feet  3. SEVERITY: \"How bad is the swelling?\" (e.g., localized; mild, moderate, severe)   - Localized - small area of swelling localized to one leg   - MILD pedal edema - swelling limited to foot and ankle, pitting edema < 1/4 inch (6 mm) deep, rest and elevation eliminate most or all swelling   - MODERATE edema - swelling of lower leg to knee, pitting edema > 1/4 inch (6 mm) deep, rest and elevation only partially reduce swelling   - SEVERE edema - swelling extends above knee, facial or hand swelling present       mild  4. REDNESS: \"Does the swelling look red or infected?\"      yes  5. PAIN: \"Is the swelling painful to touch?\" If so, ask: \"How painful is it?\"   (Scale 1-10; mild, moderate or severe)      Yes, when she stands starts burning   6. FEVER: \"Do you have a fever?\" If so, ask: \"What is it, how was it measured, and when did it start?\"       no  7. CAUSE: \"What do you think is causing the leg swelling?\"      Standing on hard floor   8. MEDICAL HISTORY: \"Do you have a history of heart failure, kidney disease, liver failure, or " "cancer?\"      no  9. RECURRENT SYMPTOM: \"Have you had leg swelling before?\" If so, ask: \"When was the last time?\" \"What happened that time?\"      no  10. OTHER SYMPTOMS: \"Do you have any other symptoms?\" (e.g., chest pain, difficulty breathing)        no  11. PREGNANCY: \"Is there any chance you are pregnant?\" \"When was your last menstrual period?\"        na    Protocols used: LEG SWELLING AND EDEMA-ADULT-AH, ANKLE SWELLING-ADULT-AH      "

## 2021-02-25 NOTE — NURSING NOTE
Obtained consent from patient to get medical records from [facility]. Fax sent to their medical records dept. Waiting on response.   Detail Level: Zone

## 2021-03-10 ENCOUNTER — NURSE TRIAGE (OUTPATIENT)
Dept: CALL CENTER | Facility: HOSPITAL | Age: 71
End: 2021-03-10

## 2021-03-10 NOTE — TELEPHONE ENCOUNTER
Caller wanting someone to deliver a knee brace or crutches to her home. She pulled a muscle beside her right knee and it is painful. She says she talked to someone at the ER and they recommended a brace and crutches. Advised she would need an MD order for equipment to be delivered unless she wanted to pay for it.She is sitting in a chair and has not tried to get up on it or walk on it. Her [facility] office is closed. She would like an organization like [facility] or someone to bring her a brace and crutches. There are all kinds of organizations that will do things. Unable to complete triage or care advise. Caller hung up.    Reason for Disposition  • [1] High-risk adult (e.g., age > 60, osteoporosis, chronic steroid use) AND [2] limping    Additional Information  • Negative: Serious injury with multiple fractures (broken bones)  • Negative: [1] Major bleeding (e.g., actively dripping or spurting) AND [2] can't be stopped  • Negative: Bullet wound, stabbed by knife, or other serious penetrating wound  • Negative: Looks like a dislocated joint (crooked or deformed)  • Negative: Can't stand (bear weight) or walk  • Negative: Sounds like a life-threatening emergency to the triager  • Negative: Wound looks infected  • Negative: Leg pain from overuse (e.g., sports, running, physical work)  • Negative: Leg pain not from an injury  • Negative: Injury mainly to the hip  • Negative: Injury mainly to knee  • Negative: Injury mainly to foot or ankle  • Negative: Skin is split open or gaping (or length > 1/2 inch or 12 mm)  • Negative: [1] Bleeding AND [2] won't stop after 10 minutes of direct pressure (using correct technique)  • Negative: [1] Dirt in the wound AND [2] not removed with 15 minutes of scrubbing  • Negative: Sounds like a serious injury to the triager  • Negative: [1] SEVERE pain (e.g. excruciating)  AND [2] not improved 2 hours after pain medicine/ice packs  • Negative: Suspicious history for the injury  • Negative: Large  "swelling or bruise > 2 inches (5 cm)    Answer Assessment - Initial Assessment Questions  1. MECHANISM: \"How did the injury happen?\" (e.g., twisting injury, direct blow)   Got up up off the floor and pulled a muscle  2. ONSET: \"When did the injury happen?\" (Minutes or hours ago)     A hour ago  3. LOCATION: \"Where is the injury located?\"   Right knee  4. APPEARANCE of INJURY: \"What does the injury look like?\"  (e.g., deformity of leg)  Knee is bent  5. SEVERITY: \"Can you put weight on that leg?\" \"Can you walk?\"   Has not tried to stand or put weight on it  6. SIZE: For cuts, bruises, or swelling, ask: \"How large is it?\" (e.g., inches or centimeters)   no  7. PAIN: \"Is there pain?\" If so, ask: \"How bad is the pain?\"  (Scale 1-10; or mild, moderate, severe)  8/10  8. TETANUS: For any breaks in the skin, ask: \"When was the last tetanus booster?\"      *No Answer*  9. OTHER SYMPTOMS: \"Do you have any other symptoms?\"   No swelling  10. PREGNANCY: \"Is there any chance you are pregnant?\" \"When was your last menstrual period?\"  na    Protocols used: LEG INJURY-ADULT-      "

## 2021-05-12 ENCOUNTER — NURSE TRIAGE (OUTPATIENT)
Dept: CALL CENTER | Facility: HOSPITAL | Age: 71
End: 2021-05-12

## 2021-05-12 NOTE — TELEPHONE ENCOUNTER
"    Reason for Disposition  • [1] Caller requesting NON-URGENT health information AND [2] PCP's office is the best resource    Additional Information  • Negative: [1] Caller is not with the adult (patient) AND [2] reporting urgent symptoms  • Negative: Lab result questions  • Negative: Medication questions  • Negative: Caller can't be reached by phone  • Negative: Caller has already spoken to PCP or another triager  • Negative: RN needs further essential information from caller in order to complete triage  • Negative: Requesting regular office appointment    Answer Assessment - Initial Assessment Questions  1. REASON FOR CALL or QUESTION: \"What is your reason for calling today?\" or \"How can I best help you?\" or \"What question do you have that I can help answer?\"  A few weeks ago a friend fell on her knee and it knocked it out of socket.Knee was out of socket for about an hour.  It does that on occasion and the it will go back into place. Knee is in place now.   Also has a rib that bothers her from time to time, she had it x-rayed and was told nothing wrong with it. Asking about CT scan or further studies. Advised to follow up with PCP for further studies or perhaps a referral to ortho    Protocols used: INFORMATION ONLY CALL - NO TRIAGE-ADULT-      "

## 2021-10-10 NOTE — DISCHARGE INSTR - ACTIVITY
Activity as tolerated  
Implemented All Universal Safety Interventions:  [place] to call system. Call bell, personal items and telephone within reach. Instruct patient to call for assistance. Room bathroom lighting operational. Non-slip footwear when patient is off stretcher. Physically safe environment: no spills, clutter or unnecessary equipment. Stretcher in lowest position, wheels locked, appropriate side rails in place.

## 2021-11-13 ENCOUNTER — TRANSCRIBE ORDERS (OUTPATIENT)
Dept: ADMINISTRATIVE | Facility: HOSPITAL | Age: 71
End: 2021-11-13

## 2021-11-13 DIAGNOSIS — I73.9 PERIPHERAL VASCULAR DISEASE, UNSPECIFIED (HCC): Primary | ICD-10-CM

## 2021-11-13 DIAGNOSIS — F17.210 CIGARETTE SMOKER: ICD-10-CM

## 2022-03-22 ENCOUNTER — HOSPITAL ENCOUNTER (OUTPATIENT)
Dept: HOSPITAL 79 - US | Age: 72
End: 2022-03-22
Attending: FAMILY MEDICINE
Payer: MEDICARE

## 2022-03-22 DIAGNOSIS — I73.9: Primary | ICD-10-CM

## 2022-09-09 ENCOUNTER — HOSPITAL ENCOUNTER (OUTPATIENT)
Dept: HOSPITAL 79 - CT | Age: 72
End: 2022-09-09
Attending: FAMILY MEDICINE
Payer: MEDICARE

## 2022-09-09 ENCOUNTER — TRANSCRIBE ORDERS (OUTPATIENT)
Dept: ADMINISTRATIVE | Facility: HOSPITAL | Age: 72
End: 2022-09-09

## 2022-09-09 DIAGNOSIS — R10.31 ABDOMINAL PAIN, RIGHT LOWER QUADRANT: Primary | ICD-10-CM

## 2022-09-09 DIAGNOSIS — R10.31: Primary | ICD-10-CM

## 2022-09-26 ENCOUNTER — HOSPITAL ENCOUNTER (OUTPATIENT)
Dept: HOSPITAL 79 - US | Age: 72
End: 2022-09-26
Attending: FAMILY MEDICINE
Payer: MEDICARE

## 2022-09-26 DIAGNOSIS — N28.89: Primary | ICD-10-CM

## 2023-10-24 ENCOUNTER — TRANSCRIBE ORDERS (OUTPATIENT)
Dept: ADMINISTRATIVE | Facility: HOSPITAL | Age: 73
End: 2023-10-24
Payer: MEDICARE

## 2023-10-24 DIAGNOSIS — N28.1 RENAL CYST, LEFT: Primary | ICD-10-CM

## 2024-01-29 ENCOUNTER — TRANSCRIBE ORDERS (OUTPATIENT)
Dept: ADMINISTRATIVE | Facility: HOSPITAL | Age: 74
End: 2024-01-29
Payer: MEDICARE

## 2024-01-29 DIAGNOSIS — R20.2 TINGLING IN EXTREMITIES: Primary | ICD-10-CM

## 2024-02-02 ENCOUNTER — TRANSCRIBE ORDERS (OUTPATIENT)
Dept: ADMINISTRATIVE | Facility: HOSPITAL | Age: 74
End: 2024-02-02
Payer: MEDICARE

## 2024-02-02 DIAGNOSIS — R20.2 TINGLING IN EXTREMITIES: Primary | ICD-10-CM

## 2024-02-05 ENCOUNTER — TRANSCRIBE ORDERS (OUTPATIENT)
Dept: ADMINISTRATIVE | Facility: HOSPITAL | Age: 74
End: 2024-02-05
Payer: MEDICARE

## 2024-02-05 DIAGNOSIS — R20.2 TINGLING IN EXTREMITIES: Primary | ICD-10-CM

## 2024-03-07 DIAGNOSIS — M25.561 RIGHT KNEE PAIN, UNSPECIFIED CHRONICITY: Primary | ICD-10-CM
